# Patient Record
Sex: FEMALE | Race: WHITE | NOT HISPANIC OR LATINO | Employment: UNEMPLOYED | URBAN - METROPOLITAN AREA
[De-identification: names, ages, dates, MRNs, and addresses within clinical notes are randomized per-mention and may not be internally consistent; named-entity substitution may affect disease eponyms.]

---

## 2022-08-30 ENCOUNTER — LAB REQUISITION (OUTPATIENT)
Dept: LAB | Facility: CLINIC | Age: 37
End: 2022-08-30

## 2022-08-30 DIAGNOSIS — N92.1 EXCESSIVE AND FREQUENT MENSTRUATION WITH IRREGULAR CYCLE: ICD-10-CM

## 2022-08-30 LAB — TSH SERPL DL<=0.005 MIU/L-ACNC: 2.63 UIU/ML (ref 0.3–4.2)

## 2022-08-30 PROCEDURE — 84443 ASSAY THYROID STIM HORMONE: CPT | Performed by: OBSTETRICS & GYNECOLOGY

## 2022-09-20 ENCOUNTER — LAB REQUISITION (OUTPATIENT)
Dept: LAB | Facility: CLINIC | Age: 37
End: 2022-09-20

## 2022-09-20 DIAGNOSIS — D25.9 LEIOMYOMA OF UTERUS, UNSPECIFIED: ICD-10-CM

## 2022-09-20 PROCEDURE — 88305 TISSUE EXAM BY PATHOLOGIST: CPT | Performed by: STUDENT IN AN ORGANIZED HEALTH CARE EDUCATION/TRAINING PROGRAM

## 2022-09-22 LAB
PATH REPORT.COMMENTS IMP SPEC: NORMAL
PATH REPORT.COMMENTS IMP SPEC: NORMAL
PATH REPORT.FINAL DX SPEC: NORMAL
PATH REPORT.GROSS SPEC: NORMAL
PATH REPORT.MICROSCOPIC SPEC OTHER STN: NORMAL
PATH REPORT.RELEVANT HX SPEC: NORMAL
PHOTO IMAGE: NORMAL

## 2022-09-28 ENCOUNTER — TRANSFERRED RECORDS (OUTPATIENT)
Dept: HEALTH INFORMATION MANAGEMENT | Facility: CLINIC | Age: 37
End: 2022-09-28

## 2022-09-29 PROBLEM — K51.90 ULCERATIVE COLITIS (H): Status: ACTIVE | Noted: 2022-09-29

## 2022-09-29 PROBLEM — D21.9 FIBROID: Status: ACTIVE | Noted: 2022-09-29

## 2022-09-29 PROBLEM — N92.1 MENOMETRORRHAGIA: Status: ACTIVE | Noted: 2022-09-29

## 2022-09-29 PROBLEM — F41.9 ANXIETY AND DEPRESSION: Status: ACTIVE | Noted: 2022-09-29

## 2022-09-29 PROBLEM — D50.9 IRON DEFICIENCY ANEMIA: Status: ACTIVE | Noted: 2022-09-29

## 2022-09-29 PROBLEM — F32.A ANXIETY AND DEPRESSION: Status: ACTIVE | Noted: 2022-09-29

## 2022-10-13 ENCOUNTER — HOSPITAL ENCOUNTER (EMERGENCY)
Facility: CLINIC | Age: 37
Discharge: HOME | End: 2022-10-13
Attending: EMERGENCY MEDICINE | Admitting: EMERGENCY MEDICINE
Payer: COMMERCIAL

## 2022-10-13 VITALS
HEIGHT: 63 IN | HEART RATE: 89 BPM | TEMPERATURE: 98.3 F | WEIGHT: 180 LBS | OXYGEN SATURATION: 99 % | RESPIRATION RATE: 19 BRPM | BODY MASS INDEX: 31.89 KG/M2 | DIASTOLIC BLOOD PRESSURE: 59 MMHG | SYSTOLIC BLOOD PRESSURE: 118 MMHG

## 2022-10-13 DIAGNOSIS — N92.1 MENORRHAGIA WITH IRREGULAR CYCLE: ICD-10-CM

## 2022-10-13 DIAGNOSIS — D25.9 UTERINE LEIOMYOMA, UNSPECIFIED LOCATION: ICD-10-CM

## 2022-10-13 DIAGNOSIS — D64.9 SYMPTOMATIC ANEMIA: ICD-10-CM

## 2022-10-13 LAB
ABO/RH(D): NORMAL
ANION GAP SERPL CALCULATED.3IONS-SCNC: 4 MMOL/L (ref 3–14)
ANTIBODY SCREEN: NEGATIVE
BASOPHILS # BLD AUTO: 0 10E3/UL (ref 0–0.2)
BASOPHILS NFR BLD AUTO: 1 %
BLD PROD TYP BPU: NORMAL
BLOOD COMPONENT TYPE: NORMAL
BUN SERPL-MCNC: 9 MG/DL (ref 7–30)
CALCIUM SERPL-MCNC: 8.4 MG/DL (ref 8.5–10.1)
CHLORIDE BLD-SCNC: 107 MMOL/L (ref 94–109)
CO2 SERPL-SCNC: 27 MMOL/L (ref 20–32)
CODING SYSTEM: NORMAL
CREAT SERPL-MCNC: 0.56 MG/DL (ref 0.52–1.04)
CROSSMATCH: NORMAL
EOSINOPHIL # BLD AUTO: 0.2 10E3/UL (ref 0–0.7)
EOSINOPHIL NFR BLD AUTO: 4 %
ERYTHROCYTE [DISTWIDTH] IN BLOOD BY AUTOMATED COUNT: 16.9 % (ref 10–15)
GFR SERPL CREATININE-BSD FRML MDRD: >90 ML/MIN/1.73M2
GLUCOSE BLD-MCNC: 121 MG/DL (ref 70–99)
HCT VFR BLD AUTO: 24.5 % (ref 35–47)
HGB BLD-MCNC: 7.2 G/DL (ref 11.7–15.7)
HOLD SPECIMEN: NORMAL
IMM GRANULOCYTES # BLD: 0 10E3/UL
IMM GRANULOCYTES NFR BLD: 0 %
ISSUE DATE AND TIME: NORMAL
LYMPHOCYTES # BLD AUTO: 1.5 10E3/UL (ref 0.8–5.3)
LYMPHOCYTES NFR BLD AUTO: 28 %
MCH RBC QN AUTO: 28.1 PG (ref 26.5–33)
MCHC RBC AUTO-ENTMCNC: 29.4 G/DL (ref 31.5–36.5)
MCV RBC AUTO: 96 FL (ref 78–100)
MONOCYTES # BLD AUTO: 0.3 10E3/UL (ref 0–1.3)
MONOCYTES NFR BLD AUTO: 6 %
NEUTROPHILS # BLD AUTO: 3.3 10E3/UL (ref 1.6–8.3)
NEUTROPHILS NFR BLD AUTO: 61 %
NRBC # BLD AUTO: 0 10E3/UL
NRBC BLD AUTO-RTO: 0 /100
PLATELET # BLD AUTO: 353 10E3/UL (ref 150–450)
POTASSIUM BLD-SCNC: 3.7 MMOL/L (ref 3.4–5.3)
RBC # BLD AUTO: 2.56 10E6/UL (ref 3.8–5.2)
SODIUM SERPL-SCNC: 138 MMOL/L (ref 133–144)
SPECIMEN EXPIRATION DATE: NORMAL
UNIT ABO/RH: NORMAL
UNIT NUMBER: NORMAL
UNIT STATUS: NORMAL
UNIT TYPE ISBT: 5100
WBC # BLD AUTO: 5.4 10E3/UL (ref 4–11)

## 2022-10-13 PROCEDURE — 86901 BLOOD TYPING SEROLOGIC RH(D): CPT | Performed by: EMERGENCY MEDICINE

## 2022-10-13 PROCEDURE — 99285 EMERGENCY DEPT VISIT HI MDM: CPT | Mod: 25

## 2022-10-13 PROCEDURE — 36430 TRANSFUSION BLD/BLD COMPNT: CPT

## 2022-10-13 PROCEDURE — 96375 TX/PRO/DX INJ NEW DRUG ADDON: CPT

## 2022-10-13 PROCEDURE — 250N000011 HC RX IP 250 OP 636: Performed by: EMERGENCY MEDICINE

## 2022-10-13 PROCEDURE — 86850 RBC ANTIBODY SCREEN: CPT | Performed by: EMERGENCY MEDICINE

## 2022-10-13 PROCEDURE — 80048 BASIC METABOLIC PNL TOTAL CA: CPT | Performed by: EMERGENCY MEDICINE

## 2022-10-13 PROCEDURE — 85025 COMPLETE CBC W/AUTO DIFF WBC: CPT | Performed by: EMERGENCY MEDICINE

## 2022-10-13 PROCEDURE — 96374 THER/PROPH/DIAG INJ IV PUSH: CPT

## 2022-10-13 PROCEDURE — P9016 RBC LEUKOCYTES REDUCED: HCPCS | Performed by: EMERGENCY MEDICINE

## 2022-10-13 PROCEDURE — 36415 COLL VENOUS BLD VENIPUNCTURE: CPT | Performed by: EMERGENCY MEDICINE

## 2022-10-13 PROCEDURE — 86923 COMPATIBILITY TEST ELECTRIC: CPT | Performed by: EMERGENCY MEDICINE

## 2022-10-13 PROCEDURE — 250N000009 HC RX 250: Performed by: EMERGENCY MEDICINE

## 2022-10-13 RX ORDER — KETOROLAC TROMETHAMINE 15 MG/ML
30 INJECTION, SOLUTION INTRAMUSCULAR; INTRAVENOUS ONCE
Status: COMPLETED | OUTPATIENT
Start: 2022-10-13 | End: 2022-10-13

## 2022-10-13 RX ADMIN — TRANEXAMIC ACID 1 G: 100 INJECTION, SOLUTION INTRAVENOUS at 12:39

## 2022-10-13 RX ADMIN — KETOROLAC TROMETHAMINE 30 MG: 15 INJECTION, SOLUTION INTRAMUSCULAR; INTRAVENOUS at 13:10

## 2022-10-13 ASSESSMENT — ENCOUNTER SYMPTOMS
WEAKNESS: 1
LIGHT-HEADEDNESS: 1
HEADACHES: 1
FATIGUE: 1

## 2022-10-13 ASSESSMENT — ACTIVITIES OF DAILY LIVING (ADL)
ADLS_ACUITY_SCORE: 35

## 2022-10-13 NOTE — ED TRIAGE NOTES
Pt. At ONC clinic today and had blood work and hgb is low. Pt was sent here for a transfusion.     Triage Assessment     Row Name 10/13/22 0926       Triage Assessment (Adult)    Airway WDL WDL       Respiratory WDL    Respiratory WDL WDL       Skin Circulation/Temperature WDL    Skin Circulation/Temperature WDL X  pale       Cardiac WDL    Cardiac WDL WDL       Peripheral/Neurovascular WDL    Peripheral Neurovascular WDL WDL       Cognitive/Neuro/Behavioral WDL    Cognitive/Neuro/Behavioral WDL WDL

## 2022-10-13 NOTE — ED PROVIDER NOTES
History   Chief Complaint:  Abnormal Labs       The history is provided by the patient.      Audelia Hernandez is a 36 year old female with history of uterine fibroid and iron-deficiency anemia who presents with low hemoglobin and vaginal bleeding. The patient states that she has a fibroid on her uterus that has caused bleeding in the past, and has a plan to have it removed on 10/19. With this, she typically does experience bleeding every 5-6 days with some clotting, but is manageable with a tampon and pad. She had her hemoglobin checked on 10/7 and it was 9.9, the highest it has been in a long time. Then beginning on 10/9, she had extensive, heavy bleeding with clots that was painful and didn't stop until the morning of 10/10. She noted that laying or sitting down helped, and tried to decrease activity the following days. Then on the afternoon of 10/12, she started experiencing more heavy bleeding that contained both clots and running blood, that continued through the night and into this morning. This is accompanied with worsening weakness, headaches, fatigue, and lightheadedness. She went into to [facility] to have her hemoglobin checked, as they are concerned she might also have a rare form of uterine cancer that they will be checking for on 10/19, and there her hemoglobin was noted to be 6.5, so they recommended she come to the ED. During my evaluation, she noted that she was feeling a little better and that the bleeding has lessened and is manageable now, but still has weakness, headaches, and fatigue. She also shared that she did have an iron transfusion one month ago, takes prescription iron supplements, did try hormone therapy in the past with no success, and is currently taking Myfembree, a different hormone therapy.       Review of Systems   Constitutional: Positive for fatigue.   Genitourinary: Positive for pelvic pain and vaginal bleeding (clots, running blood).   Neurological: Positive for  "weakness, light-headedness and headaches.   All other systems reviewed and are negative.        Allergies:  The patient has no known allergies.     Medications:  Zoloft  Wellbutrin  Ritalin  Imitrex  Klonopin  Azulfidine  Pristiq  Ferosul  Mariana Ortiz    Past Medical History:     Insomnia  Migraine  ADHD  OCD  Depression  Agoraphobia with panic disorder  Iron deficiency anemia due to chronic blood loss  Chronic ulcerative proctitis  Hematochezia  MARÍA  Pre-eclampsia in pregnancy  Uterine fibroid  Ulcerative colitis     Past Surgical History:     section  [place] teeth extraction     Family History:    Type II diabetes - father  Anxiety - mother  ADHD - brother    Social History:  Patient came from [facility].  Patient is unaccompanied in the ED.  Patient sees Dr. Mckoy with [facility].    Physical Exam     Patient Vitals for the past 24 hrs:   BP Temp Temp src Pulse Resp SpO2 Height Weight   10/13/22 1527 102/50 98.3  F (36.8  C) Oral 80 16 100 % -- --   10/13/22 1400 113/64 -- -- 88 14 100 % -- --   10/13/22 1350 115/61 -- -- 90 14 100 % -- --   10/13/22 1330 -- -- -- 94 13 99 % -- --   10/13/22 1313 115/61 98.8  F (37.1  C) -- 88 20 -- -- --   10/13/22 1300 111/62 -- -- 84 20 99 % -- --   10/13/22 1201 107/58 -- -- 82 22 97 % -- --   10/13/22 1100 119/61 -- -- 91 11 97 % -- --   10/13/22 1030 109/58 -- -- 93 8 99 % -- --   10/13/22 0958 114/55 -- -- 98 12 98 % -- --   10/13/22 0953 134/79 -- -- 110 17 99 % -- --   10/13/22 0915 131/71 98.2  F (36.8  C) Oral 94 16 100 % 1.6 m (5' 3\") 81.6 kg (180 lb)       Physical Exam  General: Patient is alert and normal appearing.  HEENT: Head atraumatic    Eyes: pupils equal and reactive. Conjunctiva clear   Nares: patent   Oropharynx: no lesions, uvula midline, no palatal draping, normal voice, no trismus  Neck: Supple without lymphadenopathy, no meningismus  Chest: Heart regular rate and rhythm.   Lungs: Mildly dyspneic appearing equal clear to " auscultation with no wheeze or rales  Abdomen: Soft, non tender, nondistended, normal bowel sounds  Back: No costovertebral angle tenderness, no midline C, T or L spine tenderness  Neuro: Grossly nonfocal, normal speech, strength equal bilaterally, CN 2-12 intact  Extremities: No deformities, equal radial and DP pulses. No clubbing, cyanosis.  No edema  Skin: Warm and dry with no rash.       Emergency Department Course     Laboratory:  Labs Ordered and Resulted from Time of ED Arrival to Time of ED Departure   BASIC METABOLIC PANEL - Abnormal       Result Value    Sodium 138      Potassium 3.7      Chloride 107      Carbon Dioxide (CO2) 27      Anion Gap 4      Urea Nitrogen 9      Creatinine 0.56      Calcium 8.4 (*)     Glucose 121 (*)     GFR Estimate >90     CBC WITH PLATELETS AND DIFFERENTIAL - Abnormal    WBC Count 5.4      RBC Count 2.56 (*)     Hemoglobin 7.2 (*)     Hematocrit 24.5 (*)     MCV 96      MCH 28.1      MCHC 29.4 (*)     RDW 16.9 (*)     Platelet Count 353      % Neutrophils 61      % Lymphocytes 28      % Monocytes 6      % Eosinophils 4      % Basophils 1      % Immature Granulocytes 0      NRBCs per 100 WBC 0      Absolute Neutrophils 3.3      Absolute Lymphocytes 1.5      Absolute Monocytes 0.3      Absolute Eosinophils 0.2      Absolute Basophils 0.0      Absolute Immature Granulocytes 0.0      Absolute NRBCs 0.0     TYPE AND SCREEN, ADULT    ABO/RH(D) O POS      Antibody Screen Negative      SPECIMEN EXPIRATION DATE [id number]     PREPARE RED BLOOD CELLS (UNIT)    Blood Component Type Red Blood Cells      Product Code [id number]      Unit Status Transfused      Unit Number [id number]      UNIT ABO/RH O+      CODING SYSTEM [id number]      UNIT TYPE ISBT 5100      CROSSMATCH Compatible      ISSUE DATE AND TIME [id number]     PREPARE RED BLOOD CELLS (UNIT)   TRANSFUSE RED BLOOD CELLS (UNIT)   ABO/RH TYPE AND SCREEN          Emergency Department Course:       Reviewed:  I reviewed  nursing notes, vitals, past medical history and Care Everywhere    Assessments:  0939 I obtained history and examined the patient as noted above.   1128 I rechecked the patient and explained findings.   1547 I rechecked and updated the patient.    Consults:  1105 I spoke with the nurse with Dr. Mckoy who was unwilling to speak with me over the phone, and said that any decisions need to be made with the patient's primary OB/GYN.    1123 I spoke with Dr. Leal who is on call for Dr. Cortes with [facility] about the patient's history and plan of care.    Interventions:  1239 Tranexamic acid 1 g IV  1310 Toradol 30 mg IV  1313 RBC transfusion 1 unit IV    Disposition:  The patient was discharged to home.     Impression & Plan     Medical Decision Making:  Is a 36-year-old female with menorrhagia with scheduled surgery in 6 days for a uterine fibroid with possible cancer-like features.  Patient has been on Myfembree to slow her bleeding.  She had been prescribed Lysteda but did not realize that and had never started that to control the bleeding.  Patient states that she has had increased bleeding over the last 5 days with frequent episodes of hours where she is pouring blood out of her vagina including large clots.  She began feeling more dizzy and lightheaded and fatigued today and went to [facility] clinic where she was found to have a hemoglobin of 6.5 and referred to the emergency department for transfusion.  Work-up in the emergency department undertaken as noted above.  She got 1 g of TXA as well as 1 unit of packed red blood cells here.  I discussed with her OB/GYN who recommended her starting Lysteda.  Patient is agreeable with this.  She will continue to plan for surgery next week.  Patient tolerated transfusion well.  Bleeding precautions and return precautions the emergency department discussed patient agreed with the plan.      Diagnosis:    ICD-10-CM    1. Menorrhagia with irregular cycle  N92.1        2. Symptomatic anemia  D64.9       3. Uterine leiomyoma, unspecified location  D25.9           Discharge Medications:  New Prescriptions    No medications on file       Scribe Disclosure:  I, Oksana Christian, am serving as a scribe at 9:39 AM on 10/13/2022 to document services personally performed by Zuri Hammonds MD based on my observations and the provider's statements to me.        Zuri Hammonds MD  10/13/22 8234

## 2022-10-17 ENCOUNTER — HOSPITAL ENCOUNTER (OUTPATIENT)
Facility: CLINIC | Age: 37
Discharge: HOME | End: 2022-10-17
Admitting: OBSTETRICS & GYNECOLOGY
Payer: COMMERCIAL

## 2022-10-17 VITALS
DIASTOLIC BLOOD PRESSURE: 66 MMHG | TEMPERATURE: 95.8 F | RESPIRATION RATE: 16 BRPM | HEART RATE: 100 BPM | SYSTOLIC BLOOD PRESSURE: 131 MMHG | OXYGEN SATURATION: 98 %

## 2022-10-17 LAB
ABO/RH(D): NORMAL
ANTIBODY SCREEN: NEGATIVE
BLD PROD TYP BPU: NORMAL
BLOOD COMPONENT TYPE: NORMAL
CODING SYSTEM: NORMAL
CROSSMATCH: NORMAL
ISSUE DATE AND TIME: NORMAL
SPECIMEN EXPIRATION DATE: NORMAL
UNIT ABO/RH: NORMAL
UNIT NUMBER: NORMAL
UNIT STATUS: NORMAL
UNIT TYPE ISBT: 5100

## 2022-10-17 PROCEDURE — 36430 TRANSFUSION BLD/BLD COMPNT: CPT

## 2022-10-17 PROCEDURE — 86923 COMPATIBILITY TEST ELECTRIC: CPT | Performed by: OBSTETRICS & GYNECOLOGY

## 2022-10-17 PROCEDURE — 86850 RBC ANTIBODY SCREEN: CPT | Performed by: OBSTETRICS & GYNECOLOGY

## 2022-10-17 PROCEDURE — 86901 BLOOD TYPING SEROLOGIC RH(D): CPT | Performed by: OBSTETRICS & GYNECOLOGY

## 2022-10-17 PROCEDURE — P9016 RBC LEUKOCYTES REDUCED: HCPCS | Performed by: OBSTETRICS & GYNECOLOGY

## 2022-10-17 PROCEDURE — 36415 COLL VENOUS BLD VENIPUNCTURE: CPT | Performed by: OBSTETRICS & GYNECOLOGY

## 2022-10-17 RX ORDER — METHYLPHENIDATE HYDROCHLORIDE 20 MG/1
30 TABLET ORAL 3 TIMES DAILY PRN
COMMUNITY

## 2022-10-17 RX ORDER — ZOLPIDEM TARTRATE 10 MG/1
10 TABLET ORAL
COMMUNITY

## 2022-10-17 RX ORDER — OXYCODONE AND ACETAMINOPHEN 5; 325 MG/1; MG/1
1 TABLET ORAL EVERY 6 HOURS PRN
Status: ON HOLD | COMMUNITY
End: 2022-11-15

## 2022-10-17 RX ORDER — TRAZODONE HYDROCHLORIDE 100 MG/1
50 TABLET ORAL
COMMUNITY

## 2022-10-17 RX ORDER — FOLIC ACID 1 MG/1
1 TABLET ORAL EVERY EVENING
COMMUNITY

## 2022-10-17 RX ORDER — DESVENLAFAXINE 50 MG/1
50 TABLET, FILM COATED, EXTENDED RELEASE ORAL EVERY EVENING
COMMUNITY

## 2022-10-17 RX ORDER — CLONAZEPAM 1 MG/1
1 TABLET ORAL 2 TIMES DAILY PRN
COMMUNITY
End: 2022-10-19

## 2022-10-17 RX ORDER — MESALAMINE 1000 MG/1
1000 SUPPOSITORY RECTAL AT BEDTIME
Status: ON HOLD | COMMUNITY
End: 2022-10-17

## 2022-10-17 RX ORDER — FERROUS SULFATE 325(65) MG
325 TABLET ORAL 2 TIMES DAILY
COMMUNITY

## 2022-10-17 RX ORDER — SULFASALAZINE 500 MG/1
500 TABLET ORAL EVERY EVENING
COMMUNITY

## 2022-10-17 RX ORDER — TRANEXAMIC ACID 650 MG/1
1300 TABLET ORAL 3 TIMES DAILY
COMMUNITY
End: 2022-11-14

## 2022-10-17 RX ORDER — SUMATRIPTAN 50 MG/1
50 TABLET, FILM COATED ORAL
Status: ON HOLD | COMMUNITY
End: 2022-10-17

## 2022-10-17 RX ORDER — SERTRALINE HYDROCHLORIDE 100 MG/1
200 TABLET, FILM COATED ORAL EVERY EVENING
COMMUNITY

## 2022-10-17 ASSESSMENT — ACTIVITIES OF DAILY LIVING (ADL)
ADLS_ACUITY_SCORE: 35

## 2022-10-17 NOTE — PROGRESS NOTES
1455 Report received from Kerri Garcia RN.  1515 Pt ambulating in halls with good edmar. Transfusion continues w/o difficulty. No reaction symptoms noted.  1550 Pt resting quietly. Watching TV. No complaints. Family at bedside.  1615 Transfusion complete. No transfusion reaction.   1645 Discharge instructions given to pt. All questions & concerns addressed.  1647 Pt discharged per ambulatory to private vehicle. Accompanied by family. All personal belongings taken with pt.

## 2022-10-17 NOTE — PROGRESS NOTES
"Care Suites Admission Nursing Note    Patient Information  Name: Audelia Hernandez  Age: 36 year old  Reason for admission: Hgb. 7.5   Transfuse 1 PRBC   Care Suites arrival time: 1230    Visitor Information  Name: Kelley Sue   Informed of visitor restrictions: Yes  1 visitor allowed per patient   Visitor must screen negative for COVID symptoms   Visitor must wear a mask  Waiting rooms closed to visitors    Patient Admission/Assessment   Pre-procedure assessment complete: Yes  Surgery (uterine fibroid) on Weds., 10/19  10/13- Hgb. 6.5 transfused with 1 UPRBC  10/17- Hgb. 7.5   If abnormal assessment/labs, provider notified: N/A  NPO: N/A  Medications held per instructions/orders: Yes  Consent: obtained  If applicable, pregnancy test status: deferred  Patient oriented to room: Yes  Education/questions answered: Yes  Plan/other: Transfuse 1 unit PRBC  1400- VSS, Pt. Does c/o \"migraine\" headache that has come on suddenly. Pt. Given ice packs and caffeine   1415- 1 unit PRBC started     1500- Report given to Mell APONTE    Discharge Planning  Discharge name/phone number: Kelley sue    [phone]  Overnight post sedation caregiver: self  Discharge location: home    Kerri Garcia RN         "

## 2022-10-18 NOTE — OR NURSING
Dr. Mckoy's nurse called re:pt will have hemoglobin drawn tomorrow morning at 9am at Dr. Mckoy's office, may need blood transfusion tomorrow morning.  Office will send patient to [place] after blood draw if she needs blood transfusion before surgery.  If she does not need blood transfusion, pt will arrive at 11am.  Hemoglobin 6.5 on 10/14/22, received 1 unit blood.  Monday 10/17 Hemoglobin 7.5, pt received 1 unit blood.  [facility] PACU charge nurse notified.  Type and Screen done 10/17, per blood bank pt does not need a new type and screen if transfusion is needed 10/19.

## 2022-10-19 ENCOUNTER — ANESTHESIA EVENT (OUTPATIENT)
Dept: SURGERY | Facility: CLINIC | Age: 37
End: 2022-10-19
Payer: COMMERCIAL

## 2022-10-19 ENCOUNTER — ANESTHESIA (OUTPATIENT)
Dept: SURGERY | Facility: CLINIC | Age: 37
End: 2022-10-19
Payer: COMMERCIAL

## 2022-10-19 ENCOUNTER — HOSPITAL ENCOUNTER (OUTPATIENT)
Facility: CLINIC | Age: 37
Discharge: HOME | End: 2022-10-19
Attending: OBSTETRICS & GYNECOLOGY | Admitting: OBSTETRICS & GYNECOLOGY
Payer: COMMERCIAL

## 2022-10-19 ENCOUNTER — HOSPITAL ENCOUNTER (EMERGENCY)
Facility: CLINIC | Age: 37
Discharge: HOME | End: 2022-10-20
Attending: EMERGENCY MEDICINE | Admitting: EMERGENCY MEDICINE
Payer: COMMERCIAL

## 2022-10-19 VITALS
BODY MASS INDEX: 34.04 KG/M2 | DIASTOLIC BLOOD PRESSURE: 65 MMHG | SYSTOLIC BLOOD PRESSURE: 122 MMHG | WEIGHT: 185 LBS | HEIGHT: 62 IN | TEMPERATURE: 99 F | OXYGEN SATURATION: 99 % | HEART RATE: 84 BPM | RESPIRATION RATE: 16 BRPM

## 2022-10-19 DIAGNOSIS — G89.18 ACUTE POST-OPERATIVE PAIN: ICD-10-CM

## 2022-10-19 DIAGNOSIS — R30.0 DYSURIA: ICD-10-CM

## 2022-10-19 DIAGNOSIS — D21.9 FIBROID: Primary | ICD-10-CM

## 2022-10-19 LAB
ALBUMIN UR-MCNC: NEGATIVE MG/DL
APPEARANCE UR: CLEAR
B-HCG SERPL-ACNC: <1 IU/L (ref 0–5)
BILIRUB UR QL STRIP: NEGATIVE
COLOR UR AUTO: ABNORMAL
GLUCOSE UR STRIP-MCNC: NEGATIVE MG/DL
HGB UR QL STRIP: ABNORMAL
IRON SATN MFR SERPL: 8 % (ref 15–46)
IRON SERPL-MCNC: 25 UG/DL (ref 35–180)
KETONES UR STRIP-MCNC: NEGATIVE MG/DL
LEUKOCYTE ESTERASE UR QL STRIP: NEGATIVE
NITRATE UR QL: POSITIVE
PH UR STRIP: 7 [PH] (ref 5–7)
RBC URINE: 0 /HPF
SP GR UR STRIP: 1.01 (ref 1–1.03)
SQUAMOUS EPITHELIAL: <1 /HPF
TIBC SERPL-MCNC: 329 UG/DL (ref 240–430)
UROBILINOGEN UR STRIP-MCNC: NORMAL MG/DL
WBC URINE: 1 /HPF

## 2022-10-19 PROCEDURE — 370N000017 HC ANESTHESIA TECHNICAL FEE, PER MIN: Performed by: OBSTETRICS & GYNECOLOGY

## 2022-10-19 PROCEDURE — 81001 URINALYSIS AUTO W/SCOPE: CPT | Performed by: EMERGENCY MEDICINE

## 2022-10-19 PROCEDURE — 250N000009 HC RX 250: Performed by: OBSTETRICS & GYNECOLOGY

## 2022-10-19 PROCEDURE — 250N000009 HC RX 250

## 2022-10-19 PROCEDURE — 99284 EMERGENCY DEPT VISIT MOD MDM: CPT

## 2022-10-19 PROCEDURE — 250N000011 HC RX IP 250 OP 636: Performed by: OBSTETRICS & GYNECOLOGY

## 2022-10-19 PROCEDURE — 360N000080 HC SURGERY LEVEL 7, PER MIN: Performed by: OBSTETRICS & GYNECOLOGY

## 2022-10-19 PROCEDURE — 710N000009 HC RECOVERY PHASE 1, LEVEL 1, PER MIN: Performed by: OBSTETRICS & GYNECOLOGY

## 2022-10-19 PROCEDURE — 710N000012 HC RECOVERY PHASE 2, PER MINUTE: Performed by: OBSTETRICS & GYNECOLOGY

## 2022-10-19 PROCEDURE — 88305 TISSUE EXAM BY PATHOLOGIST: CPT | Mod: TC | Performed by: OBSTETRICS & GYNECOLOGY

## 2022-10-19 PROCEDURE — 87086 URINE CULTURE/COLONY COUNT: CPT | Performed by: EMERGENCY MEDICINE

## 2022-10-19 PROCEDURE — 250N000013 HC RX MED GY IP 250 OP 250 PS 637: Performed by: EMERGENCY MEDICINE

## 2022-10-19 PROCEDURE — 272N000001 HC OR GENERAL SUPPLY STERILE: Performed by: OBSTETRICS & GYNECOLOGY

## 2022-10-19 PROCEDURE — 36415 COLL VENOUS BLD VENIPUNCTURE: CPT | Performed by: OBSTETRICS & GYNECOLOGY

## 2022-10-19 PROCEDURE — 250N000013 HC RX MED GY IP 250 OP 250 PS 637: Performed by: ANESTHESIOLOGY

## 2022-10-19 PROCEDURE — 83550 IRON BINDING TEST: CPT | Performed by: PHYSICIAN ASSISTANT

## 2022-10-19 PROCEDURE — 250N000011 HC RX IP 250 OP 636

## 2022-10-19 PROCEDURE — 999N000141 HC STATISTIC PRE-PROCEDURE NURSING ASSESSMENT: Performed by: OBSTETRICS & GYNECOLOGY

## 2022-10-19 PROCEDURE — 258N000003 HC RX IP 258 OP 636

## 2022-10-19 PROCEDURE — 88305 TISSUE EXAM BY PATHOLOGIST: CPT | Mod: 26 | Performed by: PATHOLOGY

## 2022-10-19 PROCEDURE — 84702 CHORIONIC GONADOTROPIN TEST: CPT | Performed by: OBSTETRICS & GYNECOLOGY

## 2022-10-19 PROCEDURE — 250N000025 HC SEVOFLURANE, PER MIN: Performed by: OBSTETRICS & GYNECOLOGY

## 2022-10-19 PROCEDURE — 250N000011 HC RX IP 250 OP 636: Performed by: ANESTHESIOLOGY

## 2022-10-19 PROCEDURE — 88331 PATH CONSLTJ SURG 1 BLK 1SPC: CPT | Mod: 26 | Performed by: PATHOLOGY

## 2022-10-19 PROCEDURE — 88331 PATH CONSLTJ SURG 1 BLK 1SPC: CPT | Mod: TC | Performed by: OBSTETRICS & GYNECOLOGY

## 2022-10-19 PROCEDURE — 258N000001 HC RX 258: Performed by: OBSTETRICS & GYNECOLOGY

## 2022-10-19 RX ORDER — LIDOCAINE HYDROCHLORIDE 20 MG/ML
INJECTION, SOLUTION INFILTRATION; PERINEURAL PRN
Status: DISCONTINUED | OUTPATIENT
Start: 2022-10-19 | End: 2022-10-19

## 2022-10-19 RX ORDER — ONDANSETRON 4 MG/1
4 TABLET, ORALLY DISINTEGRATING ORAL EVERY 30 MIN PRN
Status: DISCONTINUED | OUTPATIENT
Start: 2022-10-19 | End: 2022-10-19 | Stop reason: HOSPADM

## 2022-10-19 RX ORDER — SODIUM CHLORIDE, SODIUM LACTATE, POTASSIUM CHLORIDE, CALCIUM CHLORIDE 600; 310; 30; 20 MG/100ML; MG/100ML; MG/100ML; MG/100ML
INJECTION, SOLUTION INTRAVENOUS CONTINUOUS
Status: DISCONTINUED | OUTPATIENT
Start: 2022-10-19 | End: 2022-10-19 | Stop reason: HOSPADM

## 2022-10-19 RX ORDER — ONDANSETRON 2 MG/ML
INJECTION INTRAMUSCULAR; INTRAVENOUS PRN
Status: DISCONTINUED | OUTPATIENT
Start: 2022-10-19 | End: 2022-10-19

## 2022-10-19 RX ORDER — DEXAMETHASONE SODIUM PHOSPHATE 4 MG/ML
INJECTION, SOLUTION INTRA-ARTICULAR; INTRALESIONAL; INTRAMUSCULAR; INTRAVENOUS; SOFT TISSUE PRN
Status: DISCONTINUED | OUTPATIENT
Start: 2022-10-19 | End: 2022-10-19

## 2022-10-19 RX ORDER — ACETAMINOPHEN 500 MG
1000 TABLET ORAL EVERY 6 HOURS PRN
Status: ON HOLD | COMMUNITY
End: 2022-11-15

## 2022-10-19 RX ORDER — SODIUM CHLORIDE, SODIUM LACTATE, POTASSIUM CHLORIDE, CALCIUM CHLORIDE 600; 310; 30; 20 MG/100ML; MG/100ML; MG/100ML; MG/100ML
INJECTION, SOLUTION INTRAVENOUS CONTINUOUS PRN
Status: DISCONTINUED | OUTPATIENT
Start: 2022-10-19 | End: 2022-10-19

## 2022-10-19 RX ORDER — HYDROMORPHONE HCL IN WATER/PF 6 MG/30 ML
0.2 PATIENT CONTROLLED ANALGESIA SYRINGE INTRAVENOUS EVERY 5 MIN PRN
Status: DISCONTINUED | OUTPATIENT
Start: 2022-10-19 | End: 2022-10-19 | Stop reason: HOSPADM

## 2022-10-19 RX ORDER — GLYCOPYRROLATE 0.2 MG/ML
INJECTION, SOLUTION INTRAMUSCULAR; INTRAVENOUS PRN
Status: DISCONTINUED | OUTPATIENT
Start: 2022-10-19 | End: 2022-10-19

## 2022-10-19 RX ORDER — HYDROXYZINE HYDROCHLORIDE 50 MG/ML
25 INJECTION, SOLUTION INTRAMUSCULAR ONCE
Status: COMPLETED | OUTPATIENT
Start: 2022-10-19 | End: 2022-10-19

## 2022-10-19 RX ORDER — FENTANYL CITRATE 0.05 MG/ML
INJECTION, SOLUTION INTRAMUSCULAR; INTRAVENOUS PRN
Status: DISCONTINUED | OUTPATIENT
Start: 2022-10-19 | End: 2022-10-19

## 2022-10-19 RX ORDER — SENNA AND DOCUSATE SODIUM 50; 8.6 MG/1; MG/1
1-2 TABLET, FILM COATED ORAL 2 TIMES DAILY
Qty: 20 TABLET | Refills: 0 | Status: SHIPPED | OUTPATIENT
Start: 2022-10-19 | End: 2022-11-14

## 2022-10-19 RX ORDER — OXYCODONE HYDROCHLORIDE 5 MG/1
5 TABLET ORAL ONCE
Status: COMPLETED | OUTPATIENT
Start: 2022-10-19 | End: 2022-10-19

## 2022-10-19 RX ORDER — EPHEDRINE SULFATE 50 MG/ML
INJECTION, SOLUTION INTRAMUSCULAR; INTRAVENOUS; SUBCUTANEOUS PRN
Status: DISCONTINUED | OUTPATIENT
Start: 2022-10-19 | End: 2022-10-19

## 2022-10-19 RX ORDER — CEFAZOLIN SODIUM/WATER 2 G/20 ML
2 SYRINGE (ML) INTRAVENOUS SEE ADMIN INSTRUCTIONS
Status: DISCONTINUED | OUTPATIENT
Start: 2022-10-19 | End: 2022-10-19 | Stop reason: HOSPADM

## 2022-10-19 RX ORDER — OXYCODONE HYDROCHLORIDE 5 MG/1
5 TABLET ORAL
Status: CANCELLED | OUTPATIENT
Start: 2022-10-19

## 2022-10-19 RX ORDER — ONDANSETRON 2 MG/ML
4 INJECTION INTRAMUSCULAR; INTRAVENOUS EVERY 30 MIN PRN
Status: DISCONTINUED | OUTPATIENT
Start: 2022-10-19 | End: 2022-10-19 | Stop reason: HOSPADM

## 2022-10-19 RX ORDER — DEXMEDETOMIDINE HYDROCHLORIDE 4 UG/ML
INJECTION, SOLUTION INTRAVENOUS PRN
Status: DISCONTINUED | OUTPATIENT
Start: 2022-10-19 | End: 2022-10-19

## 2022-10-19 RX ORDER — CELECOXIB 100 MG/1
100 CAPSULE ORAL 2 TIMES DAILY
Qty: 30 CAPSULE | Refills: 1 | Status: SHIPPED | OUTPATIENT
Start: 2022-10-19 | End: 2022-11-14

## 2022-10-19 RX ORDER — FENTANYL CITRATE 0.05 MG/ML
25 INJECTION, SOLUTION INTRAMUSCULAR; INTRAVENOUS EVERY 5 MIN PRN
Status: DISCONTINUED | OUTPATIENT
Start: 2022-10-19 | End: 2022-10-19 | Stop reason: HOSPADM

## 2022-10-19 RX ORDER — VASOPRESSIN 20 U/ML
INJECTION PARENTERAL PRN
Status: DISCONTINUED | OUTPATIENT
Start: 2022-10-19 | End: 2022-10-19 | Stop reason: HOSPADM

## 2022-10-19 RX ORDER — BUPIVACAINE HYDROCHLORIDE AND EPINEPHRINE 5; 5 MG/ML; UG/ML
INJECTION, SOLUTION PERINEURAL PRN
Status: DISCONTINUED | OUTPATIENT
Start: 2022-10-19 | End: 2022-10-19 | Stop reason: HOSPADM

## 2022-10-19 RX ORDER — ACETAMINOPHEN 325 MG/1
975 TABLET ORAL ONCE
Status: CANCELLED | OUTPATIENT
Start: 2022-10-19 | End: 2022-10-19

## 2022-10-19 RX ORDER — HYDROMORPHONE HYDROCHLORIDE 1 MG/ML
INJECTION, SOLUTION INTRAMUSCULAR; INTRAVENOUS; SUBCUTANEOUS PRN
Status: DISCONTINUED | OUTPATIENT
Start: 2022-10-19 | End: 2022-10-19

## 2022-10-19 RX ORDER — PHENAZOPYRIDINE HYDROCHLORIDE 100 MG/1
100 TABLET, FILM COATED ORAL ONCE
Status: COMPLETED | OUTPATIENT
Start: 2022-10-19 | End: 2022-10-19

## 2022-10-19 RX ORDER — ONDANSETRON 4 MG/1
4 TABLET, ORALLY DISINTEGRATING ORAL EVERY 8 HOURS PRN
COMMUNITY

## 2022-10-19 RX ORDER — CEFAZOLIN SODIUM/WATER 2 G/20 ML
2 SYRINGE (ML) INTRAVENOUS
Status: COMPLETED | OUTPATIENT
Start: 2022-10-19 | End: 2022-10-19

## 2022-10-19 RX ORDER — OXYCODONE HYDROCHLORIDE 5 MG/1
5 TABLET ORAL EVERY 6 HOURS PRN
Qty: 12 TABLET | Refills: 0 | Status: SHIPPED | OUTPATIENT
Start: 2022-10-19 | End: 2022-11-14

## 2022-10-19 RX ORDER — ACETAMINOPHEN 325 MG/1
975 TABLET ORAL ONCE
Status: DISCONTINUED | OUTPATIENT
Start: 2022-10-19 | End: 2022-10-19 | Stop reason: HOSPADM

## 2022-10-19 RX ORDER — PROPOFOL 10 MG/ML
INJECTION, EMULSION INTRAVENOUS PRN
Status: DISCONTINUED | OUTPATIENT
Start: 2022-10-19 | End: 2022-10-19

## 2022-10-19 RX ADMIN — PHENYLEPHRINE HYDROCHLORIDE 100 MCG: 10 INJECTION INTRAVENOUS at 16:27

## 2022-10-19 RX ADMIN — ROCURONIUM BROMIDE 20 MG: 50 INJECTION, SOLUTION INTRAVENOUS at 15:58

## 2022-10-19 RX ADMIN — DEXAMETHASONE SODIUM PHOSPHATE 4 MG: 4 INJECTION, SOLUTION INTRA-ARTICULAR; INTRALESIONAL; INTRAMUSCULAR; INTRAVENOUS; SOFT TISSUE at 14:15

## 2022-10-19 RX ADMIN — PHENYLEPHRINE HYDROCHLORIDE 100 MCG: 10 INJECTION INTRAVENOUS at 16:33

## 2022-10-19 RX ADMIN — PROPOFOL 30 MG: 10 INJECTION, EMULSION INTRAVENOUS at 16:19

## 2022-10-19 RX ADMIN — PHENYLEPHRINE HYDROCHLORIDE 100 MCG: 10 INJECTION INTRAVENOUS at 13:59

## 2022-10-19 RX ADMIN — PHENYLEPHRINE HYDROCHLORIDE 0.4 MCG/KG/MIN: 10 INJECTION INTRAVENOUS at 14:00

## 2022-10-19 RX ADMIN — Medication 5 MG: at 16:39

## 2022-10-19 RX ADMIN — ROCURONIUM BROMIDE 20 MG: 50 INJECTION, SOLUTION INTRAVENOUS at 14:32

## 2022-10-19 RX ADMIN — ONDANSETRON 4 MG: 2 INJECTION INTRAMUSCULAR; INTRAVENOUS at 16:12

## 2022-10-19 RX ADMIN — HYDROXYZINE HYDROCHLORIDE 25 MG: 50 INJECTION, SOLUTION INTRAMUSCULAR at 17:56

## 2022-10-19 RX ADMIN — PHENYLEPHRINE HYDROCHLORIDE 100 MCG: 10 INJECTION INTRAVENOUS at 16:30

## 2022-10-19 RX ADMIN — HYDROMORPHONE HYDROCHLORIDE 0.5 MG: 1 INJECTION, SOLUTION INTRAMUSCULAR; INTRAVENOUS; SUBCUTANEOUS at 16:49

## 2022-10-19 RX ADMIN — HYDROMORPHONE HYDROCHLORIDE 0.2 MG: 0.2 INJECTION, SOLUTION INTRAMUSCULAR; INTRAVENOUS; SUBCUTANEOUS at 17:34

## 2022-10-19 RX ADMIN — GLYCOPYRROLATE 0.2 MG: 0.2 INJECTION, SOLUTION INTRAMUSCULAR; INTRAVENOUS at 16:40

## 2022-10-19 RX ADMIN — ROCURONIUM BROMIDE 10 MG: 50 INJECTION, SOLUTION INTRAVENOUS at 15:25

## 2022-10-19 RX ADMIN — ONDANSETRON 4 MG: 2 INJECTION INTRAMUSCULAR; INTRAVENOUS at 17:27

## 2022-10-19 RX ADMIN — SODIUM CHLORIDE, SODIUM LACTATE, POTASSIUM CHLORIDE, CALCIUM CHLORIDE: 600; 310; 30; 20 INJECTION, SOLUTION INTRAVENOUS at 14:00

## 2022-10-19 RX ADMIN — MIDAZOLAM 2 MG: 1 INJECTION INTRAMUSCULAR; INTRAVENOUS at 13:50

## 2022-10-19 RX ADMIN — HYDROMORPHONE HYDROCHLORIDE 0.2 MG: 0.2 INJECTION, SOLUTION INTRAMUSCULAR; INTRAVENOUS; SUBCUTANEOUS at 17:26

## 2022-10-19 RX ADMIN — ROCURONIUM BROMIDE 10 MG: 50 INJECTION, SOLUTION INTRAVENOUS at 14:38

## 2022-10-19 RX ADMIN — ROCURONIUM BROMIDE 10 MG: 50 INJECTION, SOLUTION INTRAVENOUS at 15:46

## 2022-10-19 RX ADMIN — SUGAMMADEX 200 MG: 100 INJECTION, SOLUTION INTRAVENOUS at 16:37

## 2022-10-19 RX ADMIN — ROCURONIUM BROMIDE 50 MG: 50 INJECTION, SOLUTION INTRAVENOUS at 13:55

## 2022-10-19 RX ADMIN — DEXMEDETOMIDINE HYDROCHLORIDE 12 MCG: 200 INJECTION INTRAVENOUS at 15:21

## 2022-10-19 RX ADMIN — HYDROMORPHONE HYDROCHLORIDE 0.2 MG: 0.2 INJECTION, SOLUTION INTRAMUSCULAR; INTRAVENOUS; SUBCUTANEOUS at 17:43

## 2022-10-19 RX ADMIN — HYDROMORPHONE HYDROCHLORIDE 0.2 MG: 0.2 INJECTION, SOLUTION INTRAMUSCULAR; INTRAVENOUS; SUBCUTANEOUS at 17:18

## 2022-10-19 RX ADMIN — GLYCOPYRROLATE 0.2 MG: 0.2 INJECTION, SOLUTION INTRAMUSCULAR; INTRAVENOUS at 14:10

## 2022-10-19 RX ADMIN — FENTANYL CITRATE 100 MCG: 50 INJECTION INTRAVENOUS at 13:54

## 2022-10-19 RX ADMIN — OXYCODONE HYDROCHLORIDE 5 MG: 5 TABLET ORAL at 19:03

## 2022-10-19 RX ADMIN — PHENYLEPHRINE HYDROCHLORIDE 100 MCG: 10 INJECTION INTRAVENOUS at 14:03

## 2022-10-19 RX ADMIN — DEXMEDETOMIDINE HYDROCHLORIDE 8 MCG: 200 INJECTION INTRAVENOUS at 15:02

## 2022-10-19 RX ADMIN — PROPOFOL 200 MG: 10 INJECTION, EMULSION INTRAVENOUS at 13:54

## 2022-10-19 RX ADMIN — PHENAZOPYRIDINE HYDROCHLORIDE 100 MG: 100 TABLET ORAL at 23:13

## 2022-10-19 RX ADMIN — ROCURONIUM BROMIDE 10 MG: 50 INJECTION, SOLUTION INTRAVENOUS at 15:32

## 2022-10-19 RX ADMIN — FENTANYL CITRATE 25 MCG: 50 INJECTION, SOLUTION INTRAMUSCULAR; INTRAVENOUS at 17:06

## 2022-10-19 RX ADMIN — SODIUM CHLORIDE, POTASSIUM CHLORIDE, SODIUM LACTATE AND CALCIUM CHLORIDE: 600; 310; 30; 20 INJECTION, SOLUTION INTRAVENOUS at 13:50

## 2022-10-19 RX ADMIN — Medication 2 G: at 14:00

## 2022-10-19 RX ADMIN — HYDROMORPHONE HYDROCHLORIDE 0.5 MG: 1 INJECTION, SOLUTION INTRAMUSCULAR; INTRAVENOUS; SUBCUTANEOUS at 14:44

## 2022-10-19 RX ADMIN — LIDOCAINE HYDROCHLORIDE 100 MG: 20 INJECTION, SOLUTION INFILTRATION; PERINEURAL at 13:54

## 2022-10-19 RX ADMIN — FENTANYL CITRATE 25 MCG: 50 INJECTION, SOLUTION INTRAMUSCULAR; INTRAVENOUS at 17:12

## 2022-10-19 ASSESSMENT — ACTIVITIES OF DAILY LIVING (ADL)
ADLS_ACUITY_SCORE: 35
ADLS_ACUITY_SCORE: 33

## 2022-10-19 ASSESSMENT — LIFESTYLE VARIABLES: TOBACCO_USE: 1

## 2022-10-19 ASSESSMENT — ENCOUNTER SYMPTOMS
ABDOMINAL PAIN: 1
FREQUENCY: 1
DYSURIA: 1

## 2022-10-19 NOTE — DISCHARGE INSTRUCTIONS
Same Day Surgery Discharge Instructions for  Sedation and General Anesthesia     It's not unusual to feel dizzy, light-headed or faint for up to 24 hours after surgery or while taking pain medication.  If you have these symptoms: sit for a few minutes before standing and have someone assist you when you get up to walk or use the bathroom.    You should rest and relax for the next 24 hours. We recommend you make arrangements to have an adult stay with you for at least 24 hours after your discharge.  Avoid hazardous and strenuous activity.    DO NOT DRIVE any vehicle or operate mechanical equipment for 24 hours following the end of your surgery.  Even though you may feel normal, your reactions may be affected by the medication you have received.    Do not drink alcoholic beverages for 24 hours following surgery.     Slowly progress to your regular diet as you feel able. It's not unusual to feel nauseated and/or vomit after receiving anesthesia.  If you develop these symptoms, drink clear liquids (apple juice, ginger ale, broth, 7-up, etc. ) until you feel better.  If your nausea and vomiting persists for 24 hours, please notify your surgeon.      All narcotic pain medications, along with inactivity and anesthesia, can cause constipation. Drinking plenty of liquids and increasing fiber intake will help.    For any questions of a medical nature, call your surgeon.    Do not make important decisions for 24 hours.    If you had general anesthesia, you may have a sore throat for a couple of days related to the breathing tube used during surgery.  You may use Cepacol lozenges to help with this discomfort.  If it worsens or if you develop a fever, contact your surgeon.     If you feel your pain is not well managed with the pain medications prescribed by your surgeon, please contact your surgeon's office to let them know so they can address your concerns.      **If you have questions or concerns about your procedure,   call  Dr. Mckoy [phone]**

## 2022-10-19 NOTE — ANESTHESIA PREPROCEDURE EVALUATION
Prior to Admission medications    Medication Sig Start Date End Date Taking? Authorizing Provider   acetaminophen (TYLENOL) 500 MG tablet Take 1,500 mg by mouth once (3 x 500 mg = 1,500 mg)   Yes Reported, Patient   desvenlafaxine (PRISTIQ) 50 MG 24 hr tablet Take 50 mg by mouth every evening   Yes Reported, Patient   ferrous sulfate (FEROSUL) 325 (65 Fe) MG tablet Take 650 mg by mouth every evening   Yes Reported, Patient   folic acid (FOLVITE) 1 MG tablet Take 1 mg by mouth every evening   Yes Reported, Patient   methylphenidate (RITALIN) 20 MG tablet Take 30 mg by mouth 3 times daily (with meals) (1.5 x 20 mg = 30 mg)   Yes Reported, Patient   Multiple Vitamin (MULTIVITAMIN PO) Take 1 capsule by mouth daily   Yes Reported, Patient   ondansetron (ZOFRAN ODT) 4 MG ODT tab Take 4 mg by mouth every 8 hours as needed for nausea   Yes Reported, Patient   oxyCODONE-acetaminophen (PERCOCET) 5-325 MG tablet Take 1 tablet by mouth every 6 hours as needed for severe pain   Yes Reported, Patient   sertraline (ZOLOFT) 100 MG tablet Take 200 mg by mouth every evening (2 x 100 mg = 200 mg)   Yes Reported, Patient   sulfaSALAzine (AZULFIDINE) 500 MG tablet Take 1,000 mg by mouth every evening (2 x 500 mg = 1,000 mg)   Yes Reported, Patient   tranexamic acid (LYSTEDA) 650 MG tablet Take 1,300 mg by mouth 3 times daily (2 x 650 mg = 1,300 mg)   Yes Reported, Patient   traZODone (DESYREL) 100 MG tablet Take 50 mg by mouth At Bedtime (0.5 x 100 mg = 50 mg)   Yes Reported, Patient   zolpidem (AMBIEN) 10 MG tablet Take 10 mg by mouth nightly as needed for sleep   Yes Reported, Patient     Current Facility-Administered Medications Ordered in Epic   Medication Dose Route Frequency Last Rate Last Admin     acetaminophen (TYLENOL) tablet 975 mg  975 mg Oral Once         ceFAZolin Sodium (ANCEF) injection 2 g  2 g Intravenous Pre-Op/Pre-procedure x 1 dose         ceFAZolin Sodium (ANCEF) injection 2 g  2 g Intravenous See Admin  Instructions         No current [facility]-ordered outpatient medications on file.       No results for input(s): ABO, RH in the last 82971 hours.  No results for input(s): HCG in the last 37924 hours.  No results found for this or any previous visit (from the past 744 hour(s)).Anesthesia Pre-Procedure Evaluation    Patient: Audelia Hernandez   MRN: [id number] : 1985        Procedure : Procedure(s):  ROBOTIC ASSISTED MYOMECTOMY, POSSIBLE LAPAROTOMY          Past Medical History:   Diagnosis Date     ADHD (attention deficit hyperactivity disorder)      Alleged drug diversion      Chronic insomnia      Chronic ulcerative proctitis with rectal bleeding (H)      Daily headache      MARÍA (generalized anxiety disorder)      Hematochezia      Increased frequency of urination      MDD (major depressive disorder)      Migraine      OCD (obsessive compulsive disorder)      Panic disorder without agoraphobia      Tension headache       Past Surgical History:   Procedure Laterality Date     GYN SURGERY           HEAD & NECK SURGERY      [place] teeth extraction      No Known Allergies   Social History     Tobacco Use     Smoking status: Former     Packs/day: 0.50     Types: Cigarettes     Smokeless tobacco: Never   Substance Use Topics     Alcohol use: Not Currently      Wt Readings from Last 1 Encounters:   10/19/22 83.9 kg (185 lb)        Anesthesia Evaluation   Pt has had prior anesthetic.     No history of anesthetic complications       ROS/MED HX  ENT/Pulmonary:     (+) tobacco use, Current use,     Neurologic:       Cardiovascular:       METS/Exercise Tolerance:     Hematologic:       Musculoskeletal:       GI/Hepatic:       Renal/Genitourinary:       Endo:       Psychiatric/Substance Use:       Infectious Disease:       Malignancy:       Other:            Physical Exam    Airway        Mallampati: I    Neck ROM: full     Respiratory Devices and Support         Dental  no notable dental history     (+)  caps      Cardiovascular   cardiovascular exam normal          Pulmonary   pulmonary exam normal                OUTSIDE LABS:  CBC:   Lab Results   Component Value Date    WBC 5.4 10/13/2022    HGB 7.2 (L) 10/13/2022    HCT 24.5 (L) 10/13/2022     10/13/2022     BMP:   Lab Results   Component Value Date     10/13/2022    POTASSIUM 3.7 10/13/2022    CHLORIDE 107 10/13/2022    CO2 27 10/13/2022    BUN 9 10/13/2022    CR 0.56 10/13/2022     (H) 10/13/2022     COAGS: No results found for: PTT, INR, FIBR  POC: No results found for: BGM, HCG, HCGS  HEPATIC: No results found for: ALBUMIN, PROTTOTAL, ALT, AST, GGT, ALKPHOS, BILITOTAL, BILIDIRECT, ZEE  OTHER:   Lab Results   Component Value Date    SONIDO 8.4 (L) 10/13/2022    TSH 2.63 08/30/2022       Anesthesia Plan    ASA Status:  2   NPO Status:  NPO Appropriate    Anesthesia Type: General.     - Airway: ETT   Induction: Intravenous.   Maintenance: Balanced.   Techniques and Equipment:     - Airway: Video-Laryngoscope         Consents    Anesthesia Plan(s) and associated risks, benefits, and realistic alternatives discussed. Questions answered and patient/representative(s) expressed understanding.    - Discussed:     - Discussed with:  Patient         Postoperative Care    Pain management: IV analgesics.   PONV prophylaxis: Ondansetron (or other 5HT-3)     Comments:                Kwame Hall MD

## 2022-10-19 NOTE — ANESTHESIA PROCEDURE NOTES
Airway       Patient location during procedure: OR       Procedure Start/Stop Times: 10/19/2022 1:57 PM  Staff -        Anesthesiologist:  Kwame Hall MD       CRNA: Bill Roper APRN CRNA       Other Anesthesia Staff: Sandoval Elias       Performed By: SRNA  Consent for Airway        Urgency: elective  Indications and Patient Condition       Indications for airway management: mica-procedural       Induction type:intravenous       Mask difficulty assessment: 1 - vent by mask    Final Airway Details       Final airway type: endotracheal airway       Successful airway: ETT - single  Endotracheal Airway Details        ETT size (mm): 7.0       Cuffed: yes       Successful intubation technique: video laryngoscopy       VL Blade Size: Tracy 3       Grade View of Cords: 1       Adjucts: stylet       Position: Right       Measured from: gums/teeth       Secured at (cm): 22       Bite block used: None    Post intubation assessment        Placement verified by: capnometry, equal breath sounds and chest rise        Number of attempts at approach: 1       Secured with: silk tape       Ease of procedure: easy       Dentition: Intact and Unchanged    Medication(s) Administered   Medication Administration Time: 10/19/2022 1:57 PM

## 2022-10-19 NOTE — BRIEF OP NOTE
[facility]    Brief Operative Note    Pre-operative diagnosis: Uterine mass [N85.8]  Post-operative diagnosis Same as pre-operative diagnosis    Procedure: Procedure(s):  ROBOTIC ASSISTED MYOMECTOMY, POSSIBLE LAPAROTOMY  Surgeon: Surgeon(s) and Role:     * Chantel Mckoy MD - Primary  Anesthesia: General   Estimated Blood Loss: 25mL    Drains: None  Specimens:   ID Type Source Tests Collected by Time Destination   1 : Uterine Mass Tissue Other SURGICAL PATHOLOGY EXAM Chantel Mckoy MD 10/19/2022  4:08 PM      Findings:   Large intrauterine mass, initial pathology spindle cell neoplasm, 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 Normal apperaing bilateral fallopian tubes and ovaries. .  Complications: None.  Implants: * No implants in log *

## 2022-10-19 NOTE — PROGRESS NOTES
PTA medications updated by Medication Scribe prior to surgery via phone call with patient (last doses completed by Nurse)     Medication history sources: Patient, Surescripts and H&P  In the past week, patient estimated taking medication this percent of the time: Greater than 90%  Adherence assessment: N/A Not Observed    Significant changes made to the medication list:  Patient reports no longer taking the following meds (med scribe removed from PTA med list): Clonazepam      Additional medication history information:   None    Medication reconciliation completed by provider prior to medication history? No    Time spent in this activity: 40 minutes    The information provided in this note is only as accurate as the sources available at the time of update(s)    Prior to Admission medications    Medication Sig Last Dose Taking? Auth Provider Long Term End Date   acetaminophen (TYLENOL) 500 MG tablet Take 1,500 mg by mouth once (3 x 500 mg = 1,500 mg) 10/19/2022 at am Yes Reported, Patient     desvenlafaxine (PRISTIQ) 50 MG 24 hr tablet Take 50 mg by mouth every evening 10/18/2022 at pm Yes Reported, Patient Yes    ferrous sulfate (FEROSUL) 325 (65 Fe) MG tablet Take 650 mg by mouth every evening 10/18/2022 at pm Yes Reported, Patient     folic acid (FOLVITE) 1 MG tablet Take 1 mg by mouth every evening 10/18/2022 at pm Yes Reported, Patient     methylphenidate (RITALIN) 20 MG tablet Take 30 mg by mouth 3 times daily (with meals) (1.5 x 20 mg = 30 mg)  at prn Yes Reported, Patient     Multiple Vitamin (MULTIVITAMIN PO) Take 1 capsule by mouth daily 10/18/2022 at pm Yes Reported, Patient     ondansetron (ZOFRAN ODT) 4 MG ODT tab Take 4 mg by mouth every 8 hours as needed for nausea  at prn Yes Reported, Patient     oxyCODONE-acetaminophen (PERCOCET) 5-325 MG tablet Take 1 tablet by mouth every 6 hours as needed for severe pain  at prn Yes Reported, Patient     sertraline (ZOLOFT) 100 MG tablet Take 200 mg by mouth  every evening (2 x 100 mg = 200 mg) 10/18/2022 at pm Yes Reported, Patient Yes    sulfaSALAzine (AZULFIDINE) 500 MG tablet Take 1,000 mg by mouth every evening (2 x 500 mg = 1,000 mg) 10/18/2022 at pm Yes Reported, Patient     tranexamic acid (LYSTEDA) 650 MG tablet Take 1,300 mg by mouth 3 times daily (2 x 650 mg = 1,300 mg) 10/19/2022 at am Yes Reported, Patient     traZODone (DESYREL) 100 MG tablet Take 50 mg by mouth At Bedtime (0.5 x 100 mg = 50 mg) 10/18/2022 at pm Yes Reported, Patient Yes    zolpidem (AMBIEN) 10 MG tablet Take 10 mg by mouth nightly as needed for sleep 10/18/2022 at prn Yes Reported, Patient       Medication history completed by:    Tulio Sanchez CPhT  Medication [facility]

## 2022-10-19 NOTE — INTERVAL H&P NOTE
"I have reviewed the surgical (or preoperative) H&P that is linked to this encounter, and examined the patient. There are no significant changes    Clinical Conditions Present on Arrival:  Clinically Significant Risk Factors Present on Admission                    # Obesity: Estimated body mass index is 33.84 kg/m  as calculated from the following:    Height as of this encounter: 1.575 m (5' 2\").    Weight as of this encounter: 83.9 kg (185 lb).       "

## 2022-10-19 NOTE — ANESTHESIA CARE TRANSFER NOTE
Patient: Audelia Hernandez    Procedure: Procedure(s):  ROBOTIC ASSISTED MYOMECTOMY       Diagnosis: Uterine mass [N85.8]  Diagnosis Additional Information: No value filed.    Anesthesia Type:   General     Note:    Oropharynx: oropharynx clear of all foreign objects and spontaneously breathing  Level of Consciousness: drowsy  Oxygen Supplementation: face mask  Level of Supplemental Oxygen (L/min / FiO2): 6  Independent Airway: airway patency satisfactory and stable  Dentition: dentition unchanged  Vital Signs Stable: post-procedure vital signs reviewed and stable  Report to RN Given: handoff report given  Patient transferred to: PACU    Handoff Report: Identifed the Patient, Identified the Reponsible Provider, Reviewed the pertinent medical history, Discussed the surgical course, Reviewed Intra-OP anesthesia mangement and issues during anesthesia, Set expectations for post-procedure period and Allowed opportunity for questions and acknowledgement of understanding      Vitals:  Vitals Value Taken Time   /73 10/19/22 1655   Temp     Pulse 86 10/19/22 1700   Resp 9 10/19/22 1700   SpO2 100 % 10/19/22 1700   Vitals shown include unvalidated device data.    Electronically Signed By: ALLEN Man CRNA  October 19, 2022  5:02 PM

## 2022-10-19 NOTE — ANESTHESIA POSTPROCEDURE EVALUATION
Patient: Audelia Hernandez    Procedure: Procedure(s):  ROBOTIC ASSISTED MYOMECTOMY       Anesthesia Type:  General    Note:  Disposition: Outpatient   Postop Pain Control: Uneventful            Sign Out: Well controlled pain   PONV: No   Neuro/Psych: Uneventful            Sign Out: Acceptable/Baseline neuro status   Airway/Respiratory: Uneventful            Sign Out: Acceptable/Baseline resp. status   CV/Hemodynamics: Uneventful            Sign Out: Acceptable CV status; No obvious hypovolemia; No obvious fluid overload   Other NRE: NONE   DID A NON-ROUTINE EVENT OCCUR? No           Last vitals:  Vitals Value Taken Time   /82 10/19/22 1830   Temp 36.9  C (98.4  F) 10/19/22 1800   Pulse 78 10/19/22 1833   Resp 6 10/19/22 1833   SpO2 93 % 10/19/22 1833   Vitals shown include unvalidated device data.    Electronically Signed By: Isael Burleson MD  October 19, 2022  6:54 PM

## 2022-10-20 VITALS
HEART RATE: 84 BPM | SYSTOLIC BLOOD PRESSURE: 112 MMHG | OXYGEN SATURATION: 98 % | DIASTOLIC BLOOD PRESSURE: 63 MMHG | RESPIRATION RATE: 18 BRPM

## 2022-10-20 RX ORDER — PHENAZOPYRIDINE HYDROCHLORIDE 100 MG/1
100 TABLET, FILM COATED ORAL 3 TIMES DAILY PRN
Qty: 6 TABLET | Refills: 0 | Status: SHIPPED | OUTPATIENT
Start: 2022-10-20 | End: 2022-10-22

## 2022-10-20 RX ORDER — NITROFURANTOIN 25; 75 MG/1; MG/1
100 CAPSULE ORAL 2 TIMES DAILY
Qty: 10 CAPSULE | Refills: 0 | Status: SHIPPED | OUTPATIENT
Start: 2022-10-20 | End: 2022-10-25

## 2022-10-20 NOTE — ED PROVIDER NOTES
"  History   Chief Complaint:  Post-op Problem    The history is provided by the patient.      Audelia Hernandez is a 36 year old female with history of fibroid, ulcerative colitis, anxiety, and depression who presents with post-op problem. The patient had a fibroid assisted myomectomy earlier today and has been experiencing urinary urgency, frequency, and dysuria since she was discharged at 1945. States that her discomfort feels like a \"UTI on steroids\" and that she is also experiencing abdominal discomfort near her incision sites because she gets up to use the bathroom so frequently. Adds that she has been able to urinate in small amounts.  Denies skin changes.     Review of Systems   Gastrointestinal: Positive for abdominal pain.   Genitourinary: Positive for decreased urine volume, dysuria, frequency and urgency.   All other systems reviewed and are negative.    Allergies:  No Known Allergies    Medications:  Celecoxib   Oxycodone  Senna  Desvenlafaxine   Ferrous sulfate  Methylphenidate  Ondansetron  Percocet  Sertraline  Sulfasalazine  Trazodone  Zolpidem     Past Medical History:     Fibroid   Menometrorrhagia   Iron deficiency anemia  Anxiety  Depression  Ulcerative colitis   Headache  Hematochezia  Urinary frequency  Insomnia  OCD  ADHD  Agoraphobia with panic disorder  Opioid type dependence  Acute blood loss anemia    Past Surgical History:     section  [place] teeth extraction  Fibroid removal     Family History:    Father- type II diabetes  Mother- anxiety  Brother- ADHD    Social History:  Presents with her mother  Presents via private vehicle     Physical Exam     Patient Vitals for the past 24 hrs:   BP Pulse Resp SpO2   10/20/22 0030 -- 84 18 98 %   10/20/22 0028 112/63 86 -- --       Physical Exam  General: Alert and cooperative with exam. Patient in mild to moderate distress. Normal mentation.  Head:  Scalp is NC/AT  Eyes:  No scleral icterus, PERRL  ENT:  The external nose and ears are " normal.   Neck:  Normal range of motion without rigidity.  CV:  Regular rate  Resp:  Non-labored, no retractions or accessory muscle use  GI:  Abdomen is soft, no distension, mild diffuse tenderness. No peritoneal signs. Surgical incision C/D/I.  MS:  No lower extremity edema   Skin:  Warm and dry, No rash or lesions noted.  Neuro: Oriented x 3. No gross motor deficits.    Emergency Department Course   Laboratory:  Labs Ordered and Resulted from Time of ED Arrival to Time of ED Departure   ROUTINE UA WITH MICROSCOPIC REFLEX TO CULTURE - Abnormal       Result Value    Color Urine Dark Yellow (*)     Appearance Urine Clear      Glucose Urine Negative      Bilirubin Urine Negative      Ketones Urine Negative      Specific Gravity Urine 1.007      Blood Urine Small (*)     pH Urine 7.0      Protein Albumin Urine Negative      Urobilinogen Urine Normal      Nitrite Urine Positive (*)     Leukocyte Esterase Urine Negative      RBC Urine 0      WBC Urine 1      Squamous Epithelials Urine <1     URINE CULTURE     Emergency Department Course:  Reviewed:  I reviewed nursing notes, vitals, past medical history and Care Everywhere    Assessments:  2257 I obtained history and examined the patient as noted above.   0004 I rechecked and updated the patient.     Interventions:  2313 Pyridium, 100 mg, PO    Disposition:  The patient was discharged to home.     Impression & Plan   Medical Decision Making:  Audelia Hernandez is a 36 year old female who presents for evaluation of urinary frequency and dysuria; underwent robotic fibroid removal earlier today.  On evaluation patient has mild abdominal tenderness and incision sites are without concerning finding.  Abdominal exam is as expected given surgical procedure earlier today.  UA was obtained and is nitrite positive without other significant signs of infection; patient reports symptoms feel similar to previous UTIs.  Given significant reported urinary symptoms, I did elect to  initiate patient on Macrobid and provided prescription for Pyridium.  Urine sent for culture. Return if increasing pain, vomiting, fever, or inability to tolerate the oral antibiotic.  Follow up with primary physician is indicated if not improving in 2-3 days.       Diagnosis:    ICD-10-CM    1. Dysuria  R30.0       2. Acute post-operative pain  G89.18         Discharge Medications:  New Prescriptions    NITROFURANTOIN MACROCRYSTAL-MONOHYDRATE (MACROBID) 100 MG CAPSULE    Take 1 capsule (100 mg) by mouth 2 times daily for 5 days    PHENAZOPYRIDINE (PYRIDIUM) 100 MG TABLET    Take 1 tablet (100 mg) by mouth 3 times daily as needed for urinary tract discomfort     Scribe Disclosure:  I, Betty Fishman, am serving as a scribe at 10:45 PM on 10/19/2022 to document services personally performed by Ilia Mcdonnell DO based on my observations and the provider's statements to me.      Ilia Mcdonnell DO  10/20/22 0715

## 2022-10-20 NOTE — OP NOTE
Procedure Date: 10/19/2022    PREOPERATIVE DIAGNOSES:  1.  Menorrhagia.  2.  Blood loss anemia and uterine fibroids.    POSTOPERATIVE DIAGNOSES:    1.  Menorrhagia.  2.  Blood loss anemia and uterine fibroids.    SURGEON:  Chantel Mckoy MD    ASSISTANT:  Luanne Roberts PA-C    ANESTHESIA:  General endotracheal anesthesia.    PROCEDURE PERFORMED:  Robotic-assisted laparoscopic myomectomy minilaparotomy.    INDICATIONS FOR PROCEDURE:  The patient is a 36-year-old female with a 2-4 month history of progressive menorrhagia clot formation and daily bleeding with some pelvic discomfort.  A pelvic ultrasound was obtained 09/12/2002 and revealed an enlarged uterus 11.7 x 9.5 x 9.5 containing an 8.2 cm intramural fibroid and a 1.3 cm endometrium.  Her ovaries were normal bilaterally.  A pelvic MRI was obtained 09/22/2002 and again confirmed an 8.7 cm heterogeneous mass, likely arising from the anterior and slightly left of the uterine myometrium, normal bilateral ovaries, thickened peripheral enhancement of the myometrium, a T1 hyperintense signal suggesting hemorrhagic byproducts possible red degeneration of a large fibroid, but uterine leiomyosarcoma could not be excluded.  There was no pelvic lymphadenopathy.  She had a consult with Dr. Cortes on 09/20/2022.  She had previously been on Provera, but Provera made it worse.  She had been put on Lystdea which helped more, but on that day, she had worked with excruciating pain and heavy bleeding.  Surgical and nonsurgical options were discussed.  She was interested in future fertility.  An endometrial biopsy was obtained and revealed blood clot was scant benign endometrial tissue with evidence of glandular and stromal breakdown.  She was subsequently referred to me because of the concern for leiomyosarcoma.  She did want uterine preservation if at all possible and I felt that this could very well be a leiomyoma given her age.  She was brought to the operating room for  definitive surgery today.  Prior to her surgery, she had the onset of heavy bleeding where she bled down to a hemoglobin of 6 and over the last week received 2 units of packed red blood cells with her hemoglobin preoperatively being 9.1.    FINDINGS:  The patient did have an enlarged uterus.  There was what appeared to be a fibroid anteriorly and on the left, her tubes and ovaries appeared normal.  It looked like she had a lot of retrograde bleeding into her peritoneal cavity with some residual blood when we first went in and hemosiderin deposits.  Her fallopian tubes appeared normal, especially the fimbriated ends there was no hydrosalpinx.  Her uterus was generous in size and looked like it may have adenomyosis going.    DESCRIPTION OF PROCEDURE:  The patient was taken to the operating room.  She received broad spectrum antibiotic prophylaxis.  Knee high sequential compression devices were placed on her lower extremities.  She was brought to the operating room and placed in a supine position on a pink pad on the operating table.  General endotracheal anesthesia was administered in the usual fashion.  Once intubated, she was repositioned in low lithotomy position using well-padded Yellofin stirrups.  Her arms were padded and held at her side with draw sheets over her arms and hands.  Shoulder braces were applied to her shoulders.  A Paul was placed above her forehead.  She was prepped and draped in the usual sterile fashion.  A timeout was conducted and everyone agreed upon the procedure.  I started by infiltrating the supraumbilical area 23 cm above the symphysis pubis in the midline with 0.5% Marcaine with epinephrine.  I made a small 8 mm incision and through this incision and inserted a Veress needle into the abdominal cavity.  Opening pressure was 1 mmHg.  The abdomen was insufflated with carbon dioxide to create a diffuse pneumoperitoneum.  Pressure limits were set and maintained at or below 15 mmHg  throughout the case.  With establishment of the pneumoperitoneum, the Veress needle was exchanged for an 8 mm da Lidia trocar.  Once this was in place, the camera was introduced through the trocar, confirming intraperitoneal position.  She did have filmy adhesions of the omentum to the anterior abdominal wall in the lower abdomen.  She had some hemosiderin deposits and some free blood, probably from the heavy bleeding that she had in this previous week.  Under direct visualization, 4 additional port sites were placed, an 8 mm da Lidia port was placed 8 cm to the right of the camera port in the upper abdomen, two 8 mm da Lidia ports were placed 7 cm and 14 cm to the left of the camera port in the upper abdomen.  She was then placed in 33 degrees of Trendelenburg with gravitational displacement of her bowel into the upper abdomen.  Luanne Roberts placed an 8 mm AirSeal port above the right anterior superior iliac spine.  I did use a spinal needle in a controlled release syringe.  I had the staff mixup 20 units of Pitressin and 40 mL of injectable saline.  I utilized the spinal needle through the lower abdominal wall.  I injected the uterus in 3 areas with the Pitressin solution using a total of 30 mL of the solution.  Once this was in place, we docked the robot.  Monopolar scissors were placed in the right upper robotic arm, a [place] bipolar in the medial left upper robotic arm and a ProGrasp in the lateral left robotic arm.  These instruments were advanced into the pelvis.  Using the monopolar scissors, I made an anterior incision over what appeared to be the fibroid.  I dissected the myometrium down to the fibroid and then started dissecting the walls away from the fibroid.  This fibroid was soft in consistency and not like hard like a normal fibroid and it did not have a good dissecting planes.  With countertraction and eventually switching to da Lidia tenaculum to remove the fibroid and I used the ProGrasp to  move the uterine wall, I was able to come under the fibroid and free it up.  There was one area that I thought we had gotten into the endometrial cavity.  Once the fibroid was removed.  I oversewed that area with 3-0 silk suture on an SH needle in a running fashion.  The remainder of the myometrium was closed in at least 2-layer closure using a 0 Vicryl suture on a CT2 needle.  The first to reapproximate the myometrium and the second to reapproximate the serosa.  If she conceived in the future, she will need to consider a  section as mode of delivery.  Luanne Roberts exchange the 8 mm AirSeal for a 15 mm [employer] trocar.  Through this, we introduced the 15 mm anchor bag and once it was opened, we placed the specimen within it Luanne cinched it down and removed that along with the [employer] trocar.  We exteriorized the bag I increased the incision slightly using Calvert scissors and brought the bag out, so that I could see the mass within it.  I used a combination of ring forceps and Kocher as well as a #10 scalpel blade, which we continued to change out to morcellate the specimen in the bag and it was morcellated and sent off to pathology once the bag was completely removed.  I closed the peritoneum with 2-0 Vicryl suture on an SH needle.  I closed the fascia with 0 Vicryl suture in two figure-of-eight sutures that were interrupted Luanne oRberts reapproximated the subcutaneous tissue with 4-0 Monocryl in an interrupted fashion and then closed the skin in a subcuticular fashion.  The robot was undocked.  Prior to us morcellated the specimen, the instruments were removed.  The robot was undocked.  The pneumoperitoneum remained until we finished removing the specimen and then we allowed the pneumoperitoneum dissipate.  The trocars were removed intact.  The upper 4 incisions were closed with 4-0 Monocryl in an interrupted fashion to reapproximate the subcutaneous tissue and 4-0 Monocryl in a  subcuticular fashion to reapproximate the skin.  Benzoin was placed around the incision.  Steri-Strips laid over the incisions.  Her anesthesia was reversed.  She was extubated and taken to recovery room in stable condition.     ESTIMATED BLOOD LOSS FOR THE PROCEDURE:  25 mL    Luanne Roberts PA-C, was my primary assist.  She helped me with all aspects of the case, including trocar placement, docking the robot, placement of the robotic instruments.  She assisted through the accessory port site, providing necessary countertraction, optimizing visualization and suctioning and irrigating when necessary.  She was instrumental in helping, we morcellated the specimen once it was placed in the bag and she helped me with undocking the robot and port site closure.    Chantel Mckoy MD        D: 10/19/2022   T: 10/19/2022   MT: MIS    Name:     DAIN GRAY  MRN:      [id number]        Account:        [id number]   :      1985           Procedure Date: 10/19/2022     Document: [id number]    cc:  MD Thania Cerda MD

## 2022-10-20 NOTE — ED TRIAGE NOTES
Patient was discharged from same day surgery after having a fibroid removed. She presents to the ED with complaints of urgency, frequency and burning with urination that began shortly after discharge. Patient is asking for something to take away the discomfort so she can relax     Triage Assessment     Row Name 10/19/22 4676       Triage Assessment (Adult)    Airway WDL WDL       Respiratory WDL    Respiratory WDL WDL       Skin Circulation/Temperature WDL    Skin Circulation/Temperature WDL WDL       Cardiac WDL    Cardiac WDL WDL       Peripheral/Neurovascular WDL    Peripheral Neurovascular WDL WDL       Cognitive/Neuro/Behavioral WDL    Cognitive/Neuro/Behavioral WDL WDL

## 2022-10-21 LAB
BACTERIA UR CULT: NO GROWTH
PATH REPORT.COMMENTS IMP SPEC: NORMAL
PATH REPORT.COMMENTS IMP SPEC: NORMAL
PATH REPORT.FINAL DX SPEC: NORMAL
PATH REPORT.GROSS SPEC: NORMAL
PATH REPORT.INTRAOP OBS SPEC DOC: NORMAL
PATH REPORT.MICROSCOPIC SPEC OTHER STN: NORMAL
PATH REPORT.RELEVANT HX SPEC: NORMAL
PHOTO IMAGE: NORMAL

## 2022-10-27 ENCOUNTER — HOSPITAL ENCOUNTER (OUTPATIENT)
Dept: CT IMAGING | Facility: CLINIC | Age: 37
Discharge: HOME | End: 2022-10-27
Attending: OBSTETRICS & GYNECOLOGY | Admitting: OBSTETRICS & GYNECOLOGY
Payer: COMMERCIAL

## 2022-10-27 DIAGNOSIS — R06.02 SHORTNESS OF BREATH: ICD-10-CM

## 2022-10-27 DIAGNOSIS — R10.84 GENERALIZED ABDOMINAL PAIN: ICD-10-CM

## 2022-10-27 PROCEDURE — 250N000011 HC RX IP 250 OP 636: Performed by: OBSTETRICS & GYNECOLOGY

## 2022-10-27 PROCEDURE — 74177 CT ABD & PELVIS W/CONTRAST: CPT

## 2022-10-27 PROCEDURE — 250N000009 HC RX 250: Performed by: OBSTETRICS & GYNECOLOGY

## 2022-10-27 RX ORDER — IOPAMIDOL 755 MG/ML
96 INJECTION, SOLUTION INTRAVASCULAR ONCE
Status: COMPLETED | OUTPATIENT
Start: 2022-10-27 | End: 2022-10-27

## 2022-10-27 RX ADMIN — IOPAMIDOL 96 ML: 755 INJECTION, SOLUTION INTRAVENOUS at 15:07

## 2022-10-27 RX ADMIN — SODIUM CHLORIDE 93 ML: 9 INJECTION, SOLUTION INTRAVENOUS at 15:07

## 2022-11-13 ENCOUNTER — HOSPITAL ENCOUNTER (OUTPATIENT)
Facility: CLINIC | Age: 37
Setting detail: OBSERVATION
Discharge: HOME | End: 2022-11-15
Attending: EMERGENCY MEDICINE | Admitting: HOSPITALIST
Payer: COMMERCIAL

## 2022-11-13 DIAGNOSIS — R11.0 NAUSEA: Primary | ICD-10-CM

## 2022-11-13 DIAGNOSIS — R94.31 PROLONGED Q-T INTERVAL ON ECG: ICD-10-CM

## 2022-11-13 DIAGNOSIS — K21.9 GASTROESOPHAGEAL REFLUX DISEASE, UNSPECIFIED WHETHER ESOPHAGITIS PRESENT: ICD-10-CM

## 2022-11-13 DIAGNOSIS — R10.2 PELVIC PAIN IN FEMALE: ICD-10-CM

## 2022-11-13 DIAGNOSIS — R21 RASH: ICD-10-CM

## 2022-11-13 DIAGNOSIS — E83.42 HYPOMAGNESEMIA: ICD-10-CM

## 2022-11-13 DIAGNOSIS — R42 DIZZINESS: ICD-10-CM

## 2022-11-13 DIAGNOSIS — N93.9 EPISODE OF HEAVY VAGINAL BLEEDING: ICD-10-CM

## 2022-11-13 DIAGNOSIS — R10.84 ABDOMINAL PAIN, GENERALIZED: ICD-10-CM

## 2022-11-13 DIAGNOSIS — E87.6 HYPOKALEMIA: ICD-10-CM

## 2022-11-13 DIAGNOSIS — D64.9 ANEMIA, UNSPECIFIED TYPE: ICD-10-CM

## 2022-11-13 PROCEDURE — 99285 EMERGENCY DEPT VISIT HI MDM: CPT | Mod: 25

## 2022-11-13 PROCEDURE — C9803 HOPD COVID-19 SPEC COLLECT: HCPCS

## 2022-11-13 PROCEDURE — 93005 ELECTROCARDIOGRAM TRACING: CPT

## 2022-11-14 ENCOUNTER — APPOINTMENT (OUTPATIENT)
Dept: CT IMAGING | Facility: CLINIC | Age: 37
End: 2022-11-14
Attending: EMERGENCY MEDICINE
Payer: COMMERCIAL

## 2022-11-14 LAB
ABO/RH(D): NORMAL
ALBUMIN SERPL-MCNC: 3.3 G/DL (ref 3.4–5)
ALP SERPL-CCNC: 73 U/L (ref 40–150)
ALT SERPL W P-5'-P-CCNC: 47 U/L (ref 0–50)
ANION GAP SERPL CALCULATED.3IONS-SCNC: 9 MMOL/L (ref 3–14)
ANTIBODY SCREEN: NEGATIVE
AST SERPL W P-5'-P-CCNC: 20 U/L (ref 0–45)
ATRIAL RATE - MUSE: 85 BPM
BASOPHILS # BLD AUTO: 0 10E3/UL (ref 0–0.2)
BASOPHILS NFR BLD AUTO: 0 %
BILIRUB SERPL-MCNC: 0.7 MG/DL (ref 0.2–1.3)
BUN SERPL-MCNC: 5 MG/DL (ref 7–30)
CALCIUM SERPL-MCNC: 8.6 MG/DL (ref 8.5–10.1)
CHLORIDE BLD-SCNC: 108 MMOL/L (ref 94–109)
CO2 SERPL-SCNC: 23 MMOL/L (ref 20–32)
CREAT SERPL-MCNC: 0.5 MG/DL (ref 0.52–1.04)
DIASTOLIC BLOOD PRESSURE - MUSE: NORMAL MMHG
EOSINOPHIL # BLD AUTO: 0.2 10E3/UL (ref 0–0.7)
EOSINOPHIL NFR BLD AUTO: 2 %
ERYTHROCYTE [DISTWIDTH] IN BLOOD BY AUTOMATED COUNT: 14.2 % (ref 10–15)
FLUAV RNA SPEC QL NAA+PROBE: NEGATIVE
FLUBV RNA RESP QL NAA+PROBE: NEGATIVE
GFR SERPL CREATININE-BSD FRML MDRD: >90 ML/MIN/1.73M2
GLUCOSE BLD-MCNC: 119 MG/DL (ref 70–99)
HCG SERPL QL: NEGATIVE
HCT VFR BLD AUTO: 26.4 % (ref 35–47)
HGB BLD-MCNC: 8.3 G/DL (ref 11.7–15.7)
HGB BLD-MCNC: 8.8 G/DL (ref 11.7–15.7)
HGB BLD-MCNC: 9.2 G/DL (ref 11.7–15.7)
HOLD SPECIMEN: NORMAL
IMM GRANULOCYTES # BLD: 0 10E3/UL
IMM GRANULOCYTES NFR BLD: 0 %
INTERPRETATION ECG - MUSE: NORMAL
LYMPHOCYTES # BLD AUTO: 2.5 10E3/UL (ref 0.8–5.3)
LYMPHOCYTES NFR BLD AUTO: 31 %
MAGNESIUM SERPL-MCNC: 1.5 MG/DL (ref 1.6–2.3)
MAGNESIUM SERPL-MCNC: 2.3 MG/DL (ref 1.6–2.3)
MCH RBC QN AUTO: 28.2 PG (ref 26.5–33)
MCHC RBC AUTO-ENTMCNC: 33.3 G/DL (ref 31.5–36.5)
MCV RBC AUTO: 85 FL (ref 78–100)
MONOCYTES # BLD AUTO: 0.5 10E3/UL (ref 0–1.3)
MONOCYTES NFR BLD AUTO: 7 %
NEUTROPHILS # BLD AUTO: 4.7 10E3/UL (ref 1.6–8.3)
NEUTROPHILS NFR BLD AUTO: 60 %
NRBC # BLD AUTO: 0 10E3/UL
NRBC BLD AUTO-RTO: 0 /100
P AXIS - MUSE: 66 DEGREES
PLATELET # BLD AUTO: 386 10E3/UL (ref 150–450)
POTASSIUM BLD-SCNC: 2.6 MMOL/L (ref 3.4–5.3)
POTASSIUM BLD-SCNC: 3.4 MMOL/L (ref 3.4–5.3)
POTASSIUM BLD-SCNC: 3.5 MMOL/L (ref 3.4–5.3)
PR INTERVAL - MUSE: 150 MS
PROT SERPL-MCNC: 6.5 G/DL (ref 6.8–8.8)
QRS DURATION - MUSE: 96 MS
QT - MUSE: 406 MS
QTC - MUSE: 483 MS
R AXIS - MUSE: 23 DEGREES
RBC # BLD AUTO: 3.12 10E6/UL (ref 3.8–5.2)
RSV RNA SPEC NAA+PROBE: NEGATIVE
SARS-COV-2 RNA RESP QL NAA+PROBE: NEGATIVE
SODIUM SERPL-SCNC: 140 MMOL/L (ref 133–144)
SPECIMEN EXPIRATION DATE: NORMAL
SYSTOLIC BLOOD PRESSURE - MUSE: NORMAL MMHG
T AXIS - MUSE: 34 DEGREES
VENTRICULAR RATE- MUSE: 85 BPM
WBC # BLD AUTO: 8.1 10E3/UL (ref 4–11)

## 2022-11-14 PROCEDURE — 74177 CT ABD & PELVIS W/CONTRAST: CPT

## 2022-11-14 PROCEDURE — 84132 ASSAY OF SERUM POTASSIUM: CPT | Mod: 91 | Performed by: HOSPITALIST

## 2022-11-14 PROCEDURE — 250N000009 HC RX 250: Performed by: EMERGENCY MEDICINE

## 2022-11-14 PROCEDURE — 96375 TX/PRO/DX INJ NEW DRUG ADDON: CPT | Mod: XU

## 2022-11-14 PROCEDURE — 258N000003 HC RX IP 258 OP 636: Performed by: HOSPITALIST

## 2022-11-14 PROCEDURE — 96361 HYDRATE IV INFUSION ADD-ON: CPT

## 2022-11-14 PROCEDURE — 83735 ASSAY OF MAGNESIUM: CPT | Performed by: EMERGENCY MEDICINE

## 2022-11-14 PROCEDURE — 36415 COLL VENOUS BLD VENIPUNCTURE: CPT | Performed by: EMERGENCY MEDICINE

## 2022-11-14 PROCEDURE — 250N000011 HC RX IP 250 OP 636: Performed by: EMERGENCY MEDICINE

## 2022-11-14 PROCEDURE — 250N000013 HC RX MED GY IP 250 OP 250 PS 637: Performed by: HOSPITALIST

## 2022-11-14 PROCEDURE — 96376 TX/PRO/DX INJ SAME DRUG ADON: CPT

## 2022-11-14 PROCEDURE — G0378 HOSPITAL OBSERVATION PER HR: HCPCS

## 2022-11-14 PROCEDURE — 80053 COMPREHEN METABOLIC PANEL: CPT | Performed by: EMERGENCY MEDICINE

## 2022-11-14 PROCEDURE — 36415 COLL VENOUS BLD VENIPUNCTURE: CPT | Performed by: HOSPITALIST

## 2022-11-14 PROCEDURE — 85025 COMPLETE CBC W/AUTO DIFF WBC: CPT | Performed by: EMERGENCY MEDICINE

## 2022-11-14 PROCEDURE — 84703 CHORIONIC GONADOTROPIN ASSAY: CPT | Performed by: EMERGENCY MEDICINE

## 2022-11-14 PROCEDURE — 96366 THER/PROPH/DIAG IV INF ADDON: CPT

## 2022-11-14 PROCEDURE — 96375 TX/PRO/DX INJ NEW DRUG ADDON: CPT

## 2022-11-14 PROCEDURE — 250N000013 HC RX MED GY IP 250 OP 250 PS 637: Performed by: EMERGENCY MEDICINE

## 2022-11-14 PROCEDURE — 86901 BLOOD TYPING SEROLOGIC RH(D): CPT | Performed by: EMERGENCY MEDICINE

## 2022-11-14 PROCEDURE — 83735 ASSAY OF MAGNESIUM: CPT | Performed by: HOSPITALIST

## 2022-11-14 PROCEDURE — 250N000011 HC RX IP 250 OP 636: Performed by: HOSPITALIST

## 2022-11-14 PROCEDURE — 96365 THER/PROPH/DIAG IV INF INIT: CPT | Mod: 59

## 2022-11-14 PROCEDURE — 86850 RBC ANTIBODY SCREEN: CPT | Performed by: EMERGENCY MEDICINE

## 2022-11-14 PROCEDURE — 99220 PR INITIAL OBSERVATION CARE,LEVEL III: CPT | Performed by: HOSPITALIST

## 2022-11-14 PROCEDURE — 85018 HEMOGLOBIN: CPT | Mod: 59 | Performed by: HOSPITALIST

## 2022-11-14 PROCEDURE — 87637 SARSCOV2&INF A&B&RSV AMP PRB: CPT | Performed by: EMERGENCY MEDICINE

## 2022-11-14 PROCEDURE — 84132 ASSAY OF SERUM POTASSIUM: CPT | Performed by: EMERGENCY MEDICINE

## 2022-11-14 PROCEDURE — 96376 TX/PRO/DX INJ SAME DRUG ADON: CPT | Mod: XU

## 2022-11-14 PROCEDURE — 258N000003 HC RX IP 258 OP 636: Performed by: EMERGENCY MEDICINE

## 2022-11-14 RX ORDER — ACETAMINOPHEN 650 MG/1
650 SUPPOSITORY RECTAL EVERY 6 HOURS PRN
Status: DISCONTINUED | OUTPATIENT
Start: 2022-11-14 | End: 2022-11-15 | Stop reason: HOSPADM

## 2022-11-14 RX ORDER — IBUPROFEN 800 MG/1
800 TABLET, FILM COATED ORAL EVERY 6 HOURS PRN
Status: ON HOLD | COMMUNITY
End: 2022-11-15

## 2022-11-14 RX ORDER — MOMETASONE FUROATE MONOHYDRATE 50 UG/1
2 SPRAY, METERED NASAL EVERY EVENING
COMMUNITY

## 2022-11-14 RX ORDER — MORPHINE SULFATE 4 MG/ML
4 INJECTION, SOLUTION INTRAMUSCULAR; INTRAVENOUS
Status: COMPLETED | OUTPATIENT
Start: 2022-11-14 | End: 2022-11-15

## 2022-11-14 RX ORDER — ONDANSETRON 2 MG/ML
4 INJECTION INTRAMUSCULAR; INTRAVENOUS ONCE
Status: COMPLETED | OUTPATIENT
Start: 2022-11-14 | End: 2022-11-14

## 2022-11-14 RX ORDER — MORPHINE SULFATE 4 MG/ML
4 INJECTION, SOLUTION INTRAMUSCULAR; INTRAVENOUS ONCE
Status: COMPLETED | OUTPATIENT
Start: 2022-11-14 | End: 2022-11-14

## 2022-11-14 RX ORDER — ACETAMINOPHEN 325 MG/1
650 TABLET ORAL EVERY 6 HOURS PRN
Status: DISCONTINUED | OUTPATIENT
Start: 2022-11-14 | End: 2022-11-15 | Stop reason: HOSPADM

## 2022-11-14 RX ORDER — LORAZEPAM 1 MG/1
1 TABLET ORAL ONCE
Status: COMPLETED | OUTPATIENT
Start: 2022-11-14 | End: 2022-11-14

## 2022-11-14 RX ORDER — POTASSIUM CHLORIDE 7.45 MG/ML
10 INJECTION INTRAVENOUS
Status: COMPLETED | OUTPATIENT
Start: 2022-11-14 | End: 2022-11-14

## 2022-11-14 RX ORDER — SODIUM CHLORIDE 9 MG/ML
INJECTION, SOLUTION INTRAVENOUS CONTINUOUS
Status: DISCONTINUED | OUTPATIENT
Start: 2022-11-14 | End: 2022-11-15 | Stop reason: HOSPADM

## 2022-11-14 RX ORDER — MAGNESIUM SULFATE HEPTAHYDRATE 40 MG/ML
2 INJECTION, SOLUTION INTRAVENOUS ONCE
Status: COMPLETED | OUTPATIENT
Start: 2022-11-14 | End: 2022-11-14

## 2022-11-14 RX ORDER — AMOXICILLIN 250 MG
1 CAPSULE ORAL 2 TIMES DAILY PRN
Status: DISCONTINUED | OUTPATIENT
Start: 2022-11-14 | End: 2022-11-15 | Stop reason: HOSPADM

## 2022-11-14 RX ORDER — AMOXICILLIN 250 MG
2 CAPSULE ORAL 2 TIMES DAILY PRN
Status: DISCONTINUED | OUTPATIENT
Start: 2022-11-14 | End: 2022-11-15 | Stop reason: HOSPADM

## 2022-11-14 RX ORDER — ONDANSETRON 2 MG/ML
4 INJECTION INTRAMUSCULAR; INTRAVENOUS EVERY 6 HOURS PRN
Status: CANCELLED | OUTPATIENT
Start: 2022-11-14

## 2022-11-14 RX ORDER — HYDROMORPHONE HCL IN WATER/PF 6 MG/30 ML
0.2 PATIENT CONTROLLED ANALGESIA SYRINGE INTRAVENOUS
Status: DISCONTINUED | OUTPATIENT
Start: 2022-11-14 | End: 2022-11-15 | Stop reason: HOSPADM

## 2022-11-14 RX ORDER — CEPHALEXIN 500 MG/1
500 CAPSULE ORAL 4 TIMES DAILY
Status: ON HOLD | COMMUNITY
End: 2022-11-15

## 2022-11-14 RX ORDER — ONDANSETRON 4 MG/1
4 TABLET, ORALLY DISINTEGRATING ORAL EVERY 6 HOURS PRN
Status: CANCELLED | OUTPATIENT
Start: 2022-11-14

## 2022-11-14 RX ORDER — SODIUM CHLORIDE, SODIUM LACTATE, POTASSIUM CHLORIDE, CALCIUM CHLORIDE 600; 310; 30; 20 MG/100ML; MG/100ML; MG/100ML; MG/100ML
INJECTION, SOLUTION INTRAVENOUS CONTINUOUS
Status: DISCONTINUED | OUTPATIENT
Start: 2022-11-14 | End: 2022-11-15 | Stop reason: HOSPADM

## 2022-11-14 RX ORDER — IOPAMIDOL 755 MG/ML
91 INJECTION, SOLUTION INTRAVASCULAR ONCE
Status: COMPLETED | OUTPATIENT
Start: 2022-11-14 | End: 2022-11-14

## 2022-11-14 RX ORDER — LANOLIN ALCOHOL/MO/W.PET/CERES
3 CREAM (GRAM) TOPICAL
Status: DISCONTINUED | OUTPATIENT
Start: 2022-11-14 | End: 2022-11-15 | Stop reason: HOSPADM

## 2022-11-14 RX ORDER — CLONAZEPAM 1 MG/1
1 TABLET ORAL 3 TIMES DAILY PRN
COMMUNITY

## 2022-11-14 RX ADMIN — SODIUM CHLORIDE, POTASSIUM CHLORIDE, SODIUM LACTATE AND CALCIUM CHLORIDE: 600; 310; 30; 20 INJECTION, SOLUTION INTRAVENOUS at 21:17

## 2022-11-14 RX ADMIN — MORPHINE SULFATE 4 MG: 4 INJECTION, SOLUTION INTRAMUSCULAR; INTRAVENOUS at 03:53

## 2022-11-14 RX ADMIN — MORPHINE SULFATE 4 MG: 4 INJECTION, SOLUTION INTRAMUSCULAR; INTRAVENOUS at 02:07

## 2022-11-14 RX ADMIN — POTASSIUM CHLORIDE 10 MEQ: 7.46 INJECTION, SOLUTION INTRAVENOUS at 03:19

## 2022-11-14 RX ADMIN — POTASSIUM CHLORIDE 10 MEQ: 7.46 INJECTION, SOLUTION INTRAVENOUS at 13:15

## 2022-11-14 RX ADMIN — ACETAMINOPHEN 650 MG: 325 TABLET, FILM COATED ORAL at 14:36

## 2022-11-14 RX ADMIN — LORAZEPAM 1 MG: 1 TABLET ORAL at 22:28

## 2022-11-14 RX ADMIN — IOPAMIDOL 91 ML: 755 INJECTION, SOLUTION INTRAVENOUS at 01:48

## 2022-11-14 RX ADMIN — MAGNESIUM SULFATE HEPTAHYDRATE 2 G: 2 INJECTION, SOLUTION INTRAVENOUS at 05:49

## 2022-11-14 RX ADMIN — POTASSIUM CHLORIDE 10 MEQ: 7.46 INJECTION, SOLUTION INTRAVENOUS at 12:13

## 2022-11-14 RX ADMIN — ONDANSETRON 4 MG: 2 INJECTION INTRAMUSCULAR; INTRAVENOUS at 21:12

## 2022-11-14 RX ADMIN — POTASSIUM CHLORIDE 10 MEQ: 7.46 INJECTION, SOLUTION INTRAVENOUS at 06:42

## 2022-11-14 RX ADMIN — POTASSIUM CHLORIDE 10 MEQ: 7.46 INJECTION, SOLUTION INTRAVENOUS at 15:22

## 2022-11-14 RX ADMIN — POTASSIUM CHLORIDE 10 MEQ: 7.46 INJECTION, SOLUTION INTRAVENOUS at 05:49

## 2022-11-14 RX ADMIN — SODIUM CHLORIDE: 9 INJECTION, SOLUTION INTRAVENOUS at 03:19

## 2022-11-14 RX ADMIN — POTASSIUM CHLORIDE 10 MEQ: 7.46 INJECTION, SOLUTION INTRAVENOUS at 05:00

## 2022-11-14 RX ADMIN — SODIUM CHLORIDE 80 ML: 9 INJECTION, SOLUTION INTRAVENOUS at 01:48

## 2022-11-14 RX ADMIN — MORPHINE SULFATE 4 MG: 4 INJECTION, SOLUTION INTRAMUSCULAR; INTRAVENOUS at 06:13

## 2022-11-14 RX ADMIN — ONDANSETRON 4 MG: 2 INJECTION INTRAMUSCULAR; INTRAVENOUS at 01:56

## 2022-11-14 RX ADMIN — SODIUM CHLORIDE 1000 ML: 9 INJECTION, SOLUTION INTRAVENOUS at 01:20

## 2022-11-14 RX ADMIN — IRON SUCROSE 300 MG: 20 INJECTION, SOLUTION INTRAVENOUS at 16:54

## 2022-11-14 RX ADMIN — HYDROMORPHONE HYDROCHLORIDE 0.2 MG: 0.2 INJECTION, SOLUTION INTRAMUSCULAR; INTRAVENOUS; SUBCUTANEOUS at 13:17

## 2022-11-14 RX ADMIN — MORPHINE SULFATE 4 MG: 4 INJECTION, SOLUTION INTRAMUSCULAR; INTRAVENOUS at 21:13

## 2022-11-14 RX ADMIN — SODIUM CHLORIDE, POTASSIUM CHLORIDE, SODIUM LACTATE AND CALCIUM CHLORIDE: 600; 310; 30; 20 INJECTION, SOLUTION INTRAVENOUS at 13:51

## 2022-11-14 RX ADMIN — POTASSIUM CHLORIDE 10 MEQ: 7.46 INJECTION, SOLUTION INTRAVENOUS at 14:18

## 2022-11-14 ASSESSMENT — ACTIVITIES OF DAILY LIVING (ADL)
ADLS_ACUITY_SCORE: 35

## 2022-11-14 ASSESSMENT — ENCOUNTER SYMPTOMS
LIGHT-HEADEDNESS: 1
HEADACHES: 1
FEVER: 1
RHINORRHEA: 1
BACK PAIN: 1
DIZZINESS: 1
NAUSEA: 1
SHORTNESS OF BREATH: 1

## 2022-11-14 NOTE — ED TRIAGE NOTES
Pt comes in via EMS for increased vaginal bleeding last 4 days. Pt did have a procedure 3 weeks ago to remove fibroids. Pt. States she is not sure if it is her regular period or not. Cramps with the increase in bleeding.       chest discomfort

## 2022-11-14 NOTE — ED NOTES
"[facility]  ED Nurse Handoff Report    ED Chief complaint: Vaginal Bleeding      ED Diagnosis:   Final diagnoses:   Dizziness   Anemia, unspecified type   Pelvic pain in female   Hypokalemia   Hypomagnesemia   Episode of heavy vaginal bleeding   Prolonged Q-T interval on ECG       Code Status: Full Code    Allergies: No Known Allergies    Patient Story: Pt had vaginal bleeding that increased over the last 4 days. Had a fibroid procedure 3 weeks ago.   Focused Assessment:  A/Ox4, can make needs made. RA sats 99%. Abdominal pain 8/10 cramps with bleeding and clots that have been increasing the last 4 days. Morphine works well.    Treatments and/or interventions provided: IV, labs, CT, meds  Patient's response to treatments and/or interventions: Responded well    To be done/followed up on inpatient unit:  Monitor labs, GYN consult    Does this patient have any cognitive concerns?:  None    Activity level - Baseline/Home:  Independent  Activity Level - Current:   Stand with Assist    Patient's Preferred language: English   Needed?: No    Isolation: None  Infection: Not Applicable  Patient tested for COVID 19 prior to admission: YES  Bariatric?: No    Vital Signs:   Vitals:    11/14/22 0002 11/14/22 0316 11/14/22 0416 11/14/22 0603   BP: 135/87 122/71  133/83   Pulse: 111 91  90   Resp: 25      Temp: 98.4  F (36.9  C)      TempSrc: Oral      SpO2: 100% 100% 98% 99%   Weight: 81.6 kg (180 lb)      Height: 1.575 m (5' 2\")          Cardiac Rhythm:     Was the PSS-3 completed:   Yes  What interventions are required if any?               Family Comments: Mom and Dad active in care, pt has a son  OBS brochure/video discussed/provided to patient/family: N/A              Name of person given brochure if not patient: NA              Relationship to patient: NA    For the majority of the shift this patient's behavior was Green.   Behavioral interventions performed were NOne.    ED NURSE PHONE NUMBER: FAY " 1

## 2022-11-14 NOTE — H&P
"[facility]    History and Physical  Hospitalist     Date of Admission:  11/13/2022    Assessment & Plan   Audelia Hernandez is a 36 year old female with a past medical history of ulcerative colitis and fibroid s/p surgery on 10/19 presents to the hospital with vaginal bleeding, hypokalemia and hypomagnesemia.     Hypokalemia  Hypomagnesemia  Prolonged qt  Likely secondary to poor po intake over the last 1-2 weeks. She denies any vomiting or diarrhea. She denies any laxative or diuretic use. She denies any alcohol use. She denies any past history of electrolyte abnormalities.   -replace per protocols  -monitor on tele  -avoid qt prolonging medications.     Vaginal bleeding  Normocytic anemia  Post surgical vaginal bleeding for 1 week. Bleeding has stopped in the er. Ct scan with large mass in the uterus which gyn/onc thought is likely hemorrhage in the myomectomy bed. They recommended trending hb and will seen the patient later in the day.   -trend hb q8  -consent for transfusion obtained  -f/u gyn/onc consult    Ulcerative colitis  Reports that symptoms have been well controlled on sulfasalazine  -resume pta meds once med rec is complete     Code Status   Full Code  DVT ppx: scd  Expected length of stay less than 2 days    Clinically Significant Risk Factors Present on Admission        # Hypokalemia: Lowest K = 2.6 mmol/L (Ref range: 3.4-5.3) in last 2 days, will replace as needed     # Hypomagnesemia: Lowest Mg = 1.5 mg/dL (Ref range: 1.7-2.3) in last 2 days, will replace as needed   # Hypoalbuminemia: Lowest albumin = 3.3 g/dL (Ref range: 3.5-5.2) at 11/14/2022 12:26 AM, will monitor as appropriate         # Obesity: Estimated body mass index is 32.92 kg/m  as calculated from the following:    Height as of this encounter: 1.575 m (5' 2\").    Weight as of this encounter: 81.6 kg (180 lb).           Primary Care Physician   Yaneth Rosado    Chief Complaint   Vaginal " Bleeding    History obtained from the patient    History of Present Illness   Audelia Hernandez is a 36 year old female with a significant past medical history of fibroid surgery on 10/19/22 presents to the hospital with vaginal bleeding and generalized weakness. The patient reports that after the surgery she felt like she was improving until approximately one week ago when she noticed vaginal bleeding and cramping. She spoke with her provider who thought that it might be her menstrual period. She treated her pain with her post op oxycodone, tylenol and ibuprofen. She reports that her pain at its worse was a 9/10 located midline, below the belly button and radiating down to her groin. She reports that over the week her vaginal bleeding increased to the point that she was filling up 5-6 pads a day and passing blood cloths. She started experiencing dizziness. She also reports poor po intake during the last 1-2 weeks due to nausea from her abdominal pain. Due to ongoing symptoms the patient came in to the hospital. She denies taking any other medications. She denies any vomiting or diarrhea.     Past Medical History    I have reviewed this patient's medical history and updated it with pertinent information if needed.   Past Medical History:   Diagnosis Date     ADHD (attention deficit hyperactivity disorder)      Alleged drug diversion      Chronic insomnia      Chronic ulcerative proctitis with rectal bleeding (H)      Daily headache      MARÍA (generalized anxiety disorder)      Hematochezia      Increased frequency of urination      MDD (major depressive disorder)      Migraine      OCD (obsessive compulsive disorder)      Panic disorder without agoraphobia      Tension headache        Past Surgical History   I have reviewed this patient's surgical history and updated it with pertinent information if needed.  Past Surgical History:   Procedure Laterality Date     DAVINCI MYOMECTOMY N/A 10/19/2022    Procedure:  ROBOTIC ASSISTED MYOMECTOMY;  Surgeon: Chantel Mckoy MD;  Location:  OR     GYN SURGERY           HEAD & NECK SURGERY      wisdom teeth extraction       Allergies   No Known Allergies    Social History   I have reviewed this patient's social history and updated it with pertinent information if needed. Audelia Hernandez  reports that she has quit smoking. Her smoking use included cigarettes. She smoked an average of .5 packs per day. She has never used smokeless tobacco. She reports that she does not currently use alcohol. She reports that she does not currently use drugs.    Family History   I have reviewed this patient's family history and updated it with pertinent information if needed.   Family history reviewed, no pertinent history present.     Review of Systems   The 10 point Review of Systems is negative other than noted in the HPI or here.     Physical Exam   Temp: 98.4  F (36.9  C) Temp src: Oral BP: 122/71 Pulse: 91   Resp: 25 SpO2: 98 %      Vital Signs with Ranges  Temp:  [98.4  F (36.9  C)] 98.4  F (36.9  C)  Pulse:  [] 91  Resp:  [25] 25  BP: (122-135)/(71-87) 122/71  SpO2:  [98 %-100 %] 98 %  180 lbs 0 oz  Physical Exam  Vitals reviewed.   Constitutional:       Appearance: Normal appearance.      Comments: Pleasant well developed lady seen resting in bed comfortably in no apparent distress.    HENT:      Head: Normocephalic and atraumatic.      Mouth/Throat:      Mouth: Mucous membranes are moist.      Pharynx: Oropharynx is clear.   Eyes:      Extraocular Movements: Extraocular movements intact.      Conjunctiva/sclera: Conjunctivae normal.      Pupils: Pupils are equal, round, and reactive to light.   Cardiovascular:      Rate and Rhythm: Normal rate and regular rhythm.      Pulses: Normal pulses.      Heart sounds: Normal heart sounds. No murmur heard.  Pulmonary:      Effort: Pulmonary effort is normal.      Breath sounds: Normal breath sounds. No wheezing, rhonchi or  rales.   Abdominal:      General: Abdomen is flat. Bowel sounds are normal. There is no distension.      Palpations: Abdomen is soft. There is no mass.      Tenderness: There is abdominal tenderness. There is no guarding.      Comments: Tender to palpation in the suprapubic region.   Musculoskeletal:         General: No swelling. Normal range of motion.      Cervical back: Normal range of motion and neck supple.   Skin:     General: Skin is warm and dry.   Neurological:      General: No focal deficit present.      Mental Status: She is alert and oriented to person, place, and time. Mental status is at baseline.      Cranial Nerves: No cranial nerve deficit.

## 2022-11-14 NOTE — ED PROVIDER NOTES
"  History   Chief Complaint:  Vaginal Bleeding       HPI   Audelia Hernandez is a 36 year old female s/p fibroid surgery a month ago (10/19/22) who presents with vaginal bleeding and vaginal pain. She reports the bleeding began about a week ago, and in the last 3 days has become very heavy and accompanied by clots. This evening she had \"gushing\" vaginal bleeding that was dark red with clots and had intermittent shooting vaginal pain and cramping. Following surgery, she has experienced headaches, lightheadedness, dizziness, nausea, fever, and shortness of breath. She received a chest CT two weeks ago (10/27) that was negative for blood clots. She also had back pain for the last three days. She has received 2 blood transfusions in the past prior to surgery. She sees Dr Rsoado at [facility]. Her recent fibroid surgery was performed by Dr. Mckoy at [facility] for laparoscopic surgery. She tested negative for COVID 1.5 weeks ago. She denies history of blood clot. She is a former smoker and does not use hormonal medications.     Review of Systems   Constitutional: Positive for fever.   HENT: Positive for rhinorrhea.    Respiratory: Positive for shortness of breath.    Gastrointestinal: Positive for nausea.   Genitourinary: Positive for pelvic pain, vaginal bleeding and vaginal pain.   Musculoskeletal: Positive for back pain.   Neurological: Positive for dizziness, light-headedness and headaches.   All other systems reviewed and are negative.      Allergies:  The patient has no known allergies.     Medications:  Celebrex  Pristiq   Ferosul  Folvite  Ritalin  Zofran  Senna  Zoloft  Azulfidine  Lysteda  Desyrel  Ambien    Past Medical History:     Fibroid  Menometrorrhagia  Iron deficiency anemia  Anxiety and depression  Ulcerative colitis  MARÍA  Panic disorder  OCD  ADHD  MDD  Alleged drug diversion  Migraine  Chronic insomnia  Preeclampsia  Otitis externa  Acute blood loss anemia  Opioid type dependence    Past " "Surgical History:    [place] teeth extraction   section  Myomectomy    Family History:    Father - diabetes type II  Brother - ADHD    Social History:  The patient presents to the ED with her father.  Tobacco Use: Nonsmoker  PCP: Yaneth Rosado       Physical Exam     Patient Vitals for the past 24 hrs:   BP Temp Temp src Pulse Resp SpO2 Height Weight   22 0416 -- -- -- -- -- 98 % -- --   22 0316 122/71 -- -- 91 -- 100 % -- --   22 0002 135/87 98.4  F (36.9  C) Oral 111 25 100 % 1.575 m (5' 2\") 81.6 kg (180 lb)       Physical Exam  General: Well-nourished, appears fatigued  Eyes: PERRL, conjunctivae pink no scleral icterus or conjunctival injection  ENT:  Moist mucus membranes, posterior oropharynx clear without erythema or exudates  Respiratory:  Lungs clear to auscultation bilaterally, no crackles/rubs/wheezes.  Good air movement  CV: Mildly tachycardic rate and regular rhythm, no murmurs/rubs/gallops  GI:  Abdomen soft and non-distended.  Normoactive BS.  Mild pelvic tenderness, no guarding or rebound  : Normal external exam.  Os is closed.  Minimal blood in the vaginal vault.  Skin: Warm, dry.  No rashes or petechiae  Musculoskeletal: No peripheral edema or calf tenderness  Neuro: Alert and oriented to person/place/time  Psychiatric: Anxious affect    Emergency Department Course   ECG  ECG results from 22   EKG 12 lead     Value    Systolic Blood Pressure     Diastolic Blood Pressure     Ventricular Rate 85    Atrial Rate 85    MA Interval 150    QRS Duration 96        QTc 483    P Axis 66    R AXIS 23    T Axis 34    Interpretation ECG      Sinus rhythm  Prolonged QT  Abnormal ECG  No previous ECGs available  Confirmed by GENERATED REPORT, COMPUTER ([id number]),  Lupe Lester ([id number]) on 2022 3:03:35 AM         Imaging:  CT Chest (PE) Abdomen Pelvis w Contrast   Final Result   IMPRESSION:    1.  Large 9 cm heterogeneous enhancing mass in the left central " aspect of the uterus. This is nonspecific, but could represent a fibroid. Neoplasm cannot be excluded. The mass has not convincingly changed in size since the recent comparison study dated    10/27/2022.   2.  No acute abnormality identified in the chest, abdomen or pelvis.   3.  No visualized pulmonary embolus.        Report per radiology    Laboratory:  Labs Ordered and Resulted from Time of ED Arrival to Time of ED Departure   COMPREHENSIVE METABOLIC PANEL - Abnormal       Result Value    Sodium 140      Potassium 2.6 (*)     Chloride 108      Carbon Dioxide (CO2) 23      Anion Gap 9      Urea Nitrogen 5 (*)     Creatinine 0.50 (*)     Calcium 8.6      Glucose 119 (*)     Alkaline Phosphatase 73      AST 20      ALT 47      Protein Total 6.5 (*)     Albumin 3.3 (*)     Bilirubin Total 0.7      GFR Estimate >90     CBC WITH PLATELETS AND DIFFERENTIAL - Abnormal    WBC Count 8.1      RBC Count 3.12 (*)     Hemoglobin 8.8 (*)     Hematocrit 26.4 (*)     MCV 85      MCH 28.2      MCHC 33.3      RDW 14.2      Platelet Count 386      % Neutrophils 60      % Lymphocytes 31      % Monocytes 7      % Eosinophils 2      % Basophils 0      % Immature Granulocytes 0      NRBCs per 100 WBC 0      Absolute Neutrophils 4.7      Absolute Lymphocytes 2.5      Absolute Monocytes 0.5      Absolute Eosinophils 0.2      Absolute Basophils 0.0      Absolute Immature Granulocytes 0.0      Absolute NRBCs 0.0     MAGNESIUM - Abnormal    Magnesium 1.5 (*)    HCG QUALITATIVE PREGNANCY - Normal    hCG Serum Qualitative Negative     INFLUENZA A/B & SARS-COV2 PCR MULTIPLEX   TYPE AND SCREEN, ADULT    ABO/RH(D) O POS      Antibody Screen Negative      SPECIMEN EXPIRATION DATE [id number]     ABO/RH TYPE AND SCREEN        Emergency Department Course:       Reviewed:  I reviewed nursing notes, vitals, past medical history and Care Everywhere    Assessments:  0046 I obtained history and examined the patient as noted above.   0356 I rechecked  the patient, performed pelvic exam and explained findings.     Consults:  0510 I spoke to Dr. Dubon, gynecologic oncologist, regarding the patient.  0515 I spoke to Dr. White, hospitalist, who accepts the patient.    Interventions:  0120 NS bolus 1L IV  0156 Zofran 4 mg IV  0207 Morphine 4 mg IV  0319 Potassium chloride 10 mEq in 100 mL sterile water infusion IV  0319 NS infusion IV  0353 Morphine 4 mg IV    Disposition:  The patient was admitted to the hospital under the care of Dr. White.     Impression & Plan     Medical Decision Making:  Audelia Hernandez is a 36 year old female with a history of recent fibroid myomectomy by Dr. Mckoy who comes with dizziness, shortness of breath, headache and heavy vaginal bleeding.  She is anemic and this appears to be a drop of approximately 2 g from what she reports as her recent hemoglobin check.  Fortunately, on pelvic examination the bleeding seems to have slowed significantly.  She was typed and screened in case she does require blood transfusion as she has in the past.  She was mildly tachycardic on arrival and complained of significant shortness of breath and lightheadedness.  Given the recent history I felt she was high risk for pulmonary embolism.  CT scan of the chest abdomen and pelvis was thus obtained to rule out pulmonary embolism or another complication of the surgery such as intra abdominal abscess or hemorrhage.  Fortunately no signs of pulmonary embolism or lung pathology.  The CT scan shows a large mass in the uterus.  I discussed with Dr. Rosario on call for Dr. Mckoy and she reported this is likely hemorrhage in the myomectomy bed in the setting of her period.  She recommend serial hemoglobins and they will consult on the patient to further evaluate her and provide additional treatment recommendations.  Unexpectedly, the patient's potassium is quite low.  Her magnesium is low as well.  She has a mildly prolonged QTC as expected on her  EKG.  The etiology of these electrolyte abnormalities is not clear as she has not had any vomiting or diarrhea.  We treated her with IV fluids and started replacement of both the potassium and magnesium.  She will be admitted to the observation unit for further evaluation, to complete the repletion of her electrolyte abnormalities, for serial hemoglobins and [facility]  Dr. Regalado graciously agreed to meet the patient.  The patient was in agreement with the plan as well.    Diagnosis:    ICD-10-CM    1. Dizziness  R42       2. Anemia, unspecified type  D64.9       3. Pelvic pain in female  R10.2       4. Hypokalemia  E87.6       5. Hypomagnesemia  E83.42       6. Episode of heavy vaginal bleeding  N93.9       7. Prolonged Q-T interval on ECG  R94.31           Scribe Disclosure:  IMaricruz, am serving as a scribe at 12:46 AM on 11/14/2022 to document services personally performed by Eileen Jurado MD based on my observations and the provider's statements to me.     IDebra, am serving as a scribe () on 11/14/2022 at 1:38 AM to personally document services performed by Eileen Jurado MD based on my observations and the provider's statements to me.             Eileen Jurado MD  11/14/22 0525

## 2022-11-14 NOTE — PROGRESS NOTES
Hospitalist brief update note:    Evaluated/admitted by my colleague this am. Noted Hb stable. Reports vaginal bleeding has slowed down, but frustrated about not having answer and is awaiting Gyn consult. Discussed with RN and CECY, paging Gyn.  - lytes replaced, recheck EKG in am  - IV iron ordered.    Plan as outlined on admission H&P.    Anthony Clemens MD  Hospitalist

## 2022-11-14 NOTE — ED NOTES
Call light answered.  Patient states that she has been having a lot of phlegm in her throat and she's unable to spit due to vaginal bleeding.  She was given how to use a suction.

## 2022-11-14 NOTE — PHARMACY-ADMISSION MEDICATION HISTORY
Pharmacy Medication History  Admission medication history interview status for the 11/13/2022  admission is complete. See EPIC admission navigator for prior to admission medications     Location of Interview: Patient room  Medication history sources: Patient and Surescripts    Significant changes made to the medication list:  Removed: celebrex, oxycodone, senna, tranexamic acid  Added: clonazepam, cephalexin, Nasonex  Modified: ferrous sulfate, methylphenidate, sulfasalazine and trazodone    In the past week, patient estimated taking medication this percent of the time: greater than 90%    Additional medication history information:   None    Medication reconciliation completed by provider prior to medication history? No    Time spent in this activity: 20 min    Prior to Admission medications    Medication Sig Last Dose Taking? Auth Provider Long Term End Date   acetaminophen (TYLENOL) 500 MG tablet Take 1,000 mg by mouth every 6 hours as needed (3 x 500 mg = 1,500 mg)  at prn Yes Reported, Patient     cephALEXin (KEFLEX) 500 MG capsule Take 500 mg by mouth 4 times daily X7 days, started 11/10.  Pt developed a body rash 1 day after taking and stopped. 11/11/2022 Yes Unknown, Entered By History     clonazePAM (KLONOPIN) 1 MG tablet Take 1 mg by mouth 3 times daily as needed for anxiety  Yes Unknown, Entered By History No    desvenlafaxine (PRISTIQ) 50 MG 24 hr tablet Take 50 mg by mouth every evening 11/12/2022 at pm Yes Reported, Patient Yes    ferrous sulfate (FEROSUL) 325 (65 Fe) MG tablet Take 325 mg by mouth 2 times daily 11/12/2022 at pm Yes Reported, Patient     folic acid (FOLVITE) 1 MG tablet Take 1 mg by mouth every evening 11/12/2022 Yes Reported, Patient     ibuprofen (ADVIL/MOTRIN) 800 MG tablet Take 800 mg by mouth every 6 hours as needed for moderate pain (4-6)  at prn Yes Unknown, Entered By History     methylphenidate (RITALIN) 20 MG tablet Take 30 mg by mouth 3 times daily as needed (1.5 x 20 mg = 30  mg)  at prn Yes Reported, Patient     mometasone (NASONEX) 50 MCG/ACT nasal spray Spray 2 sprays into both nostrils every evening  at pm Yes Unknown, Entered By History     Multiple Vitamin (MULTIVITAMIN PO) Take 1 capsule by mouth every evening 11/12/2022 Yes Reported, Patient     ondansetron (ZOFRAN ODT) 4 MG ODT tab Take 4 mg by mouth every 8 hours as needed for nausea  at prn Yes Reported, Patient     oxyCODONE-acetaminophen (PERCOCET) 5-325 MG tablet Take 1 tablet by mouth every 6 hours as needed for severe pain  at prn Yes Reported, Patient     sertraline (ZOLOFT) 100 MG tablet Take 200 mg by mouth every evening (2 x 100 mg = 200 mg) 11/12/2022 at pm Yes Reported, Patient Yes    sulfaSALAzine (AZULFIDINE) 500 MG tablet Take 500 mg by mouth every evening 11/12/2022 at pm Yes Reported, Patient     traZODone (DESYREL) 100 MG tablet Take 50 mg by mouth nightly as needed  at prn Yes Reported, Patient Yes    zolpidem (AMBIEN) 10 MG tablet Take 10 mg by mouth nightly as needed for sleep  at prn Yes Reported, Patient         The information provided in this note is only as accurate as the sources available at the time of update(s)

## 2022-11-14 NOTE — PLAN OF CARE
Date & Time: 11/14 9064-9494  Diagnosis: Prolonged QT  Procedures: PTA 10/19 - fibroid removal surgery  Orientation/Cognitive: AOx4  VS/O2: VSS, RA  Mobility: Independent  Diet: NPO for surgery possibility  Pain Management: PRN dilaudid or morphine or tylenol  Bowel & Bladder: Continent - minimal vag bleeding  Skin: PTA abd incisions - CDI  Abnormal Labs: K 3.4 (replacing), BUN 5, Cr 0.5, Mg 2.3 (recheck in AM), , Hgb 8.3  Tele: NSR  IV Access/Drips/Fluids: L PIV LR @ 100ml/hr  Drains: NA  Tests: Chest CT - lesion on uterus  Consults: Hospitalist, gyn/onc  Discharge Plan: Pending

## 2022-11-14 NOTE — ED NOTES
Bed: ED02  Expected date:   Expected time:   Means of arrival:   Comments:  HEMS 433/ 36F/ Vag bleeding ETA 1148 PM

## 2022-11-15 VITALS
HEIGHT: 62 IN | HEART RATE: 82 BPM | DIASTOLIC BLOOD PRESSURE: 72 MMHG | OXYGEN SATURATION: 97 % | BODY MASS INDEX: 33.13 KG/M2 | WEIGHT: 180 LBS | RESPIRATION RATE: 18 BRPM | TEMPERATURE: 98.7 F | SYSTOLIC BLOOD PRESSURE: 116 MMHG

## 2022-11-15 LAB
ANION GAP SERPL CALCULATED.3IONS-SCNC: 4 MMOL/L (ref 3–14)
BUN SERPL-MCNC: 3 MG/DL (ref 7–30)
CALCIUM SERPL-MCNC: 8.7 MG/DL (ref 8.5–10.1)
CHLORIDE BLD-SCNC: 109 MMOL/L (ref 94–109)
CO2 SERPL-SCNC: 28 MMOL/L (ref 20–32)
CREAT SERPL-MCNC: 0.48 MG/DL (ref 0.52–1.04)
GFR SERPL CREATININE-BSD FRML MDRD: >90 ML/MIN/1.73M2
GLUCOSE BLD-MCNC: 110 MG/DL (ref 70–99)
HGB BLD-MCNC: 8.7 G/DL (ref 11.7–15.7)
HGB BLD-MCNC: 8.8 G/DL (ref 11.7–15.7)
MAGNESIUM SERPL-MCNC: 2.1 MG/DL (ref 1.6–2.3)
POTASSIUM BLD-SCNC: 3.7 MMOL/L (ref 3.4–5.3)
SODIUM SERPL-SCNC: 141 MMOL/L (ref 133–144)

## 2022-11-15 PROCEDURE — 250N000013 HC RX MED GY IP 250 OP 250 PS 637: Performed by: HOSPITALIST

## 2022-11-15 PROCEDURE — 85018 HEMOGLOBIN: CPT | Performed by: HOSPITALIST

## 2022-11-15 PROCEDURE — 83735 ASSAY OF MAGNESIUM: CPT | Performed by: HOSPITALIST

## 2022-11-15 PROCEDURE — 250N000011 HC RX IP 250 OP 636: Performed by: EMERGENCY MEDICINE

## 2022-11-15 PROCEDURE — 80048 BASIC METABOLIC PNL TOTAL CA: CPT | Performed by: HOSPITALIST

## 2022-11-15 PROCEDURE — 36415 COLL VENOUS BLD VENIPUNCTURE: CPT | Performed by: HOSPITALIST

## 2022-11-15 PROCEDURE — G0378 HOSPITAL OBSERVATION PER HR: HCPCS

## 2022-11-15 PROCEDURE — 96361 HYDRATE IV INFUSION ADD-ON: CPT

## 2022-11-15 PROCEDURE — 96376 TX/PRO/DX INJ SAME DRUG ADON: CPT

## 2022-11-15 PROCEDURE — 99217 PR OBSERVATION CARE DISCHARGE: CPT | Performed by: HOSPITALIST

## 2022-11-15 RX ORDER — ZOLPIDEM TARTRATE 5 MG/1
10 TABLET ORAL
Status: DISCONTINUED | OUTPATIENT
Start: 2022-11-15 | End: 2022-11-15 | Stop reason: HOSPADM

## 2022-11-15 RX ORDER — BENZOCAINE/MENTHOL 6 MG-10 MG
LOZENGE MUCOUS MEMBRANE 2 TIMES DAILY
Status: DISCONTINUED | OUTPATIENT
Start: 2022-11-15 | End: 2022-11-15 | Stop reason: HOSPADM

## 2022-11-15 RX ORDER — CLONAZEPAM 0.5 MG/1
1 TABLET ORAL 3 TIMES DAILY PRN
Status: DISCONTINUED | OUTPATIENT
Start: 2022-11-15 | End: 2022-11-15 | Stop reason: HOSPADM

## 2022-11-15 RX ORDER — MUPIROCIN 20 MG/G
OINTMENT TOPICAL 2 TIMES DAILY
Status: DISCONTINUED | OUTPATIENT
Start: 2022-11-15 | End: 2022-11-15 | Stop reason: HOSPADM

## 2022-11-15 RX ORDER — DESLORATADINE 5 MG/1
5 TABLET ORAL DAILY
Qty: 7 TABLET | Refills: 0 | Status: SHIPPED | OUTPATIENT
Start: 2022-11-15

## 2022-11-15 RX ORDER — NALOXONE HYDROCHLORIDE 0.4 MG/ML
0.4 INJECTION, SOLUTION INTRAMUSCULAR; INTRAVENOUS; SUBCUTANEOUS
Status: DISCONTINUED | OUTPATIENT
Start: 2022-11-15 | End: 2022-11-15 | Stop reason: HOSPADM

## 2022-11-15 RX ORDER — SERTRALINE HYDROCHLORIDE 100 MG/1
200 TABLET, FILM COATED ORAL EVERY EVENING
Status: DISCONTINUED | OUTPATIENT
Start: 2022-11-15 | End: 2022-11-15 | Stop reason: HOSPADM

## 2022-11-15 RX ORDER — OXYCODONE AND ACETAMINOPHEN 5; 325 MG/1; MG/1
1 TABLET ORAL EVERY 6 HOURS PRN
Status: DISCONTINUED | OUTPATIENT
Start: 2022-11-15 | End: 2022-11-15 | Stop reason: HOSPADM

## 2022-11-15 RX ORDER — FLUTICASONE PROPIONATE 50 MCG
2 SPRAY, SUSPENSION (ML) NASAL DAILY
Status: DISCONTINUED | OUTPATIENT
Start: 2022-11-15 | End: 2022-11-15 | Stop reason: HOSPADM

## 2022-11-15 RX ORDER — DIPHENHYDRAMINE HCL 25 MG
25-50 CAPSULE ORAL EVERY 4 HOURS PRN
Status: DISCONTINUED | OUTPATIENT
Start: 2022-11-15 | End: 2022-11-15 | Stop reason: HOSPADM

## 2022-11-15 RX ORDER — SULFASALAZINE 500 MG/1
500 TABLET ORAL EVERY EVENING
Status: DISCONTINUED | OUTPATIENT
Start: 2022-11-15 | End: 2022-11-15 | Stop reason: HOSPADM

## 2022-11-15 RX ORDER — PROMETHAZINE HYDROCHLORIDE 25 MG/1
25 SUPPOSITORY RECTAL EVERY 6 HOURS PRN
Qty: 10 SUPPOSITORY | Refills: 0 | Status: SHIPPED | OUTPATIENT
Start: 2022-11-15

## 2022-11-15 RX ORDER — ACETAMINOPHEN 500 MG
1000 TABLET ORAL EVERY 6 HOURS PRN
COMMUNITY
Start: 2022-11-15

## 2022-11-15 RX ORDER — FAMOTIDINE 20 MG/1
20 TABLET, FILM COATED ORAL 2 TIMES DAILY
Qty: 14 TABLET | Refills: 0 | Status: SHIPPED | OUTPATIENT
Start: 2022-11-15

## 2022-11-15 RX ORDER — TRAZODONE HYDROCHLORIDE 50 MG/1
50 TABLET, FILM COATED ORAL
Status: DISCONTINUED | OUTPATIENT
Start: 2022-11-15 | End: 2022-11-15 | Stop reason: HOSPADM

## 2022-11-15 RX ORDER — NALOXONE HYDROCHLORIDE 0.4 MG/ML
0.2 INJECTION, SOLUTION INTRAMUSCULAR; INTRAVENOUS; SUBCUTANEOUS
Status: DISCONTINUED | OUTPATIENT
Start: 2022-11-15 | End: 2022-11-15 | Stop reason: HOSPADM

## 2022-11-15 RX ORDER — DESVENLAFAXINE 50 MG/1
50 TABLET, FILM COATED, EXTENDED RELEASE ORAL EVERY EVENING
Status: DISCONTINUED | OUTPATIENT
Start: 2022-11-15 | End: 2022-11-15 | Stop reason: HOSPADM

## 2022-11-15 RX ORDER — ZOLPIDEM TARTRATE 5 MG/1
5 TABLET ORAL ONCE
Status: COMPLETED | OUTPATIENT
Start: 2022-11-15 | End: 2022-11-15

## 2022-11-15 RX ADMIN — ACETAMINOPHEN 650 MG: 325 TABLET, FILM COATED ORAL at 01:16

## 2022-11-15 RX ADMIN — OXYCODONE HYDROCHLORIDE AND ACETAMINOPHEN 1 TABLET: 5; 325 TABLET ORAL at 14:03

## 2022-11-15 RX ADMIN — FLUTICASONE PROPIONATE 2 SPRAY: 50 SPRAY, METERED NASAL at 14:05

## 2022-11-15 RX ADMIN — DIPHENHYDRAMINE HYDROCHLORIDE 50 MG: 25 CAPSULE ORAL at 02:57

## 2022-11-15 RX ADMIN — CLONAZEPAM 1 MG: 0.5 TABLET ORAL at 14:03

## 2022-11-15 RX ADMIN — ZOLPIDEM TARTRATE 5 MG: 5 TABLET ORAL at 02:57

## 2022-11-15 RX ADMIN — MORPHINE SULFATE 4 MG: 4 INJECTION, SOLUTION INTRAMUSCULAR; INTRAVENOUS at 01:14

## 2022-11-15 RX ADMIN — Medication 3 MG: at 01:16

## 2022-11-15 RX ADMIN — ACETAMINOPHEN 650 MG: 325 TABLET, FILM COATED ORAL at 09:31

## 2022-11-15 ASSESSMENT — ACTIVITIES OF DAILY LIVING (ADL)
ADLS_ACUITY_SCORE: 35

## 2022-11-15 NOTE — CONSULTS
[facility]    Oncology Consultation     Date of Admission:  11/13/2022    Assessment & Plan   Audelia Hernandez is a 36 year old female who was admitted on 11/13/2022 with complaint of vaginal bleeding and pelvic pain.   She is s/p robotic-assisted laparoscopic myometcomy and minilaparotomy on 10/19/22 for fibroid.      Uterine fibroid:    Fortunately, operative findings were negative for cancer.  She has a longstanding history of fibroids, and this surgery was performed in the hope of alleviating her heavy menses while maintaining fertility.  The bleeding she is having is due to her regular menstrual flow.  She may want to consider hormonal manipulation to help with menorraghia, but we will defer to her regular GYN for guidance on this. She states she did not try oral contraceptives and would be open to trying this for menorrhagia.      Pelvic pain:    CT scan from yesterday is unchanged from previous done on 10/27/22, and is felt to represent a post operative hematoma, per Dr. Mckoy.  This needs no intervention, and will subside over time. WBC remains WNL's. We will plan to re-image the area in 3 months.  Pain management may be managed by hospitalist.     She may be discharged per hospitalist recommendation.       We will see her in our office for an 8 week post op visit with Dr. Mckoy.  She will be contacted by our office to schedule this.     Active Problems:    * No active hospital problems. *      Briseida Coley, APRN CNP, APRN CNP  [facility], [place]  [phone]      Code Status    Full Code    Reason for Consult   Reason for consult: I was asked by Dr. Clemens to evaluate this patient for post surgical vaginal bleeding and pain.    Primary Care Physician   Yaneth Rosado    Chief Complaint   Vaginal bleeding and pain    She was doing reasonably well post-operatively, but started on her monthly menses on Friday night, and felt that it was heavier and more painful than they  had ever been, and she felt shaky and light-headed, so went to ER for evaluation.      History of Present Illness   Audelia Hernandez is a 36 year old female who presents with uterine fibroids    2-4 month h/o progressive menorrhagia, clot formation and daily bleeding and some pelvic discomfort    9/12/22 PUS: Uterus measures 11.7 x 9.5 x 9.5 cm.  Contains an 8.2 cm intramural fibroid.  1.3 cm endometrium.  Normal bilateral ovaries    9/22/22 Pelvic MRI: Uterus measures 12.1 x 8.9 x 10.2 cm. with 8.7 x 8.7 x 8.1 cm heterogeneous mass likely arising from anterior and slighly left uterine myometrium.  Normal bilateral ovaries.  Thickened peripheral enhancement of myometrium.  T1 hyperintense signal suggestive of hemorrhagic byproducts, possible red degeneration of large fibroid but uterine leiomyosarcoma cannot be entirely excluded.  No pelvic lymphadenopathy.    9/20/22 Consult with Dr. Thania Cortes.  Has known fibroids and menorrhagia.   Previously she had tried Provera but Provera made bleeding worse.  Had been put on Lysteda which helped more.  Woke with excruciating pain and heavy bleeding.  Discussed surgical and non-surgical options.  Patient interested in future fertility.  EMB performed    Pathology: Blood clot with scant benign endometrial tissue with evidence of gland and stromal breakdown.    10/13/22: ED visit for light headedness.  Hgb 7.2. Was given 1 unite PRBC's. Started on Lysteda.     10/19/22: Robotic assisted laparoscopic myomectomy mini laparotomy.  Final pathology, benign leiomyoma with coagulative/ischemic type necrosis and myxoid degeneration.    10/19/22: Seen in ED just after hospital discharge regarding dysuria.  Started on macrobid.     10/26/22: Hgb: 8.9 U/mL, normal WBC    10/27/22: CT C/A/P: No PE.  Splenomegaly at 15.1 cm.  Heterogeneous lesion in the uterus, possibly a fibroid but incompletely characterized.  Consider ultrasound if not already performed for further evaluation.       11/3/22: Hgb 10.2,  WBC 7.9     Past Medical History   I have reviewed this patient's medical history and updated it with pertinent information if needed.   Past Medical History:   Diagnosis Date     ADHD (attention deficit hyperactivity disorder)      Alleged drug diversion      Chronic insomnia      Chronic ulcerative proctitis with rectal bleeding (H)      Daily headache      MARÍA (generalized anxiety disorder)      Hematochezia      Increased frequency of urination      MDD (major depressive disorder)      Migraine      OCD (obsessive compulsive disorder)      Panic disorder without agoraphobia      Tension headache        Past Surgical History   I have reviewed this patient's surgical history and updated it with pertinent information if needed.  Past Surgical History:   Procedure Laterality Date     DAVINCI MYOMECTOMY N/A 10/19/2022    Procedure: ROBOTIC ASSISTED MYOMECTOMY;  Surgeon: Chantel Mckoy MD;  Location: [facility] OR     GYN SURGERY           HEAD & NECK SURGERY      wisdom teeth extraction       Prior to Admission Medications   Prior to Admission Medications   Prescriptions Last Dose Informant Patient Reported? Taking?   Multiple Vitamin (MULTIVITAMIN PO) 2022 Self Yes Yes   Sig: Take 1 capsule by mouth every evening   acetaminophen (TYLENOL) 500 MG tablet  at prn Self Yes Yes   Sig: Take 1,000 mg by mouth every 6 hours as needed (3 x 500 mg = 1,500 mg)   cephALEXin (KEFLEX) 500 MG capsule 2022  Yes Yes   Sig: Take 500 mg by mouth 4 times daily X7 days, started 11/10.  Pt developed a body rash 1 day after taking and stopped.   clonazePAM (KLONOPIN) 1 MG tablet   Yes Yes   Sig: Take 1 mg by mouth 3 times daily as needed for anxiety   desvenlafaxine (PRISTIQ) 50 MG 24 hr tablet 2022 at pm Self Yes Yes   Sig: Take 50 mg by mouth every evening   ferrous sulfate (FEROSUL) 325 (65 Fe) MG tablet 2022 at pm Self Yes Yes   Sig: Take 325 mg by mouth 2 times daily   folic  acid (FOLVITE) 1 MG tablet 11/12/2022 Self Yes Yes   Sig: Take 1 mg by mouth every evening   ibuprofen (ADVIL/MOTRIN) 800 MG tablet  at prn  Yes Yes   Sig: Take 800 mg by mouth every 6 hours as needed for moderate pain (4-6)   methylphenidate (RITALIN) 20 MG tablet  at prn Self Yes Yes   Sig: Take 30 mg by mouth 3 times daily as needed (1.5 x 20 mg = 30 mg)   mometasone (NASONEX) 50 MCG/ACT nasal spray  at pm  Yes Yes   Sig: Spray 2 sprays into both nostrils every evening   ondansetron (ZOFRAN ODT) 4 MG ODT tab  at prn Self Yes Yes   Sig: Take 4 mg by mouth every 8 hours as needed for nausea   oxyCODONE-acetaminophen (PERCOCET) 5-325 MG tablet  at prn Self Yes Yes   Sig: Take 1 tablet by mouth every 6 hours as needed for severe pain   sertraline (ZOLOFT) 100 MG tablet 11/12/2022 at pm Self Yes Yes   Sig: Take 200 mg by mouth every evening (2 x 100 mg = 200 mg)   sulfaSALAzine (AZULFIDINE) 500 MG tablet 11/12/2022 at pm Self Yes Yes   Sig: Take 500 mg by mouth every evening   traZODone (DESYREL) 100 MG tablet  at prn Self Yes Yes   Sig: Take 50 mg by mouth nightly as needed   zolpidem (AMBIEN) 10 MG tablet  at prn Self Yes Yes   Sig: Take 10 mg by mouth nightly as needed for sleep      Facility-Administered Medications: None     Allergies   No Known Allergies    Social History   I have reviewed this patient's social history and updated it with pertinent information if needed. Audelia MITCHELL Eviin  reports that she has quit smoking. Her smoking use included cigarettes. She smoked an average of .5 packs per day. She has never used smokeless tobacco. She reports that she does not currently use alcohol. She reports that she does not currently use drugs.    Family History   I have reviewed this patient's family history and updated it with pertinent information if needed.   No family history on file.        Physical Exam       BP: 109/68 Pulse: 78   Resp: 16 SpO2: 100 %      Vital Signs with Ranges  Pulse:  []  78  Resp:  [16-25] 16  BP: (109-152)/(68-94) 109/68  SpO2:  [95 %-100 %] 100 %  180 lbs 0 oz    GENERAL: awake, alert, NAD  ABD:  Mildly diffusely tender.

## 2022-11-15 NOTE — PLAN OF CARE
Patient is AO X 4; VSS; on RA; pain is controlled with PO analgesics.   She is tired and fatigued today. Says the vaginal bleeding has not stopped completely, it is just lighter.     -diagnostic tests and consults completed and resulted- met   -vital signs normal or at patient baseline - met  -safe disposition plan has been identified - not met

## 2022-11-15 NOTE — DISCHARGE SUMMARY
[facility]  Hospitalist Discharge Summary      Date of Admission:  11/13/2022  Date of Discharge:  11/15/2022  Discharging Provider: Anthony Clemens MD  Discharge Service: Hospitalist Service    Discharge Diagnoses     Please see the hospital course below    Follow-ups Needed After Discharge   Follow-up Appointments     Follow-up and recommended labs and tests       Follow up with primary care provider, Yaneth Rosado, within 7 days   to evaluate medication change and for hospital follow- up.               Unresulted Labs Ordered in the Past 30 Days of this Admission     No orders found from 10/14/2022 to 11/14/2022.      These results will be followed up by  none    Discharge Disposition   Discharged to home  Condition at discharge: Stable     Hospital Course     Audelia Hernandez is a 36 year old female with a past medical history of ulcerative colitis and fibroid s/p surgery on 10/19 presents to the hospital with vaginal bleeding, hypokalemia and hypomagnesemia.      Hypokalemia  Hypomagnesemia  Prolonged QTc  Likely secondary to poor po intake over the last 1-2 weeks. She denies any vomiting or diarrhea. She denies any laxative or diuretic use. She denies any alcohol use. She denies any past history of electrolyte abnormalities. is using NSAIDs for pain. No hematochezia or melena. Corrected QTc was 483.  -replaced per protocols  -monitored on tele- without arrhythmia  -lytes now normal. Appetite low but tolerating diet.   -a week of famotidine prescribed at discharge.      Vaginal bleeding  Normocytic anemia  Patient with s/p robotic-assisted laparoscopic myometcomy and minilaparotomy on 10/19/22 for fibroid. Post surgical vaginal bleeding for 1 week. CT abdomen with large mass in the uterus which gyn/onc thought is likely hemorrhage in the myomectomy bed.    -trended hb q8- stable at 8, did not require transfusion. Vaginal bleeding decreased and now spotting only   -f/u gyn/onc  consulted. CT abd findings stable compared to post op CT from 10/27. Recommended follow up with Gyn (appt scheduled)  -PTA iron supplement continued.     Ulcerative colitis  Reports that symptoms have been well controlled on sulfasalazine    Skin rash  Patient reported rash after starting keflex recently. Not itching now. Patient says she picks on them. Don't appear to be drug rash. Not infected. Continue anti histaminic for a week.     Consultations This Hospital Stay   GYNECOLOGIC ONCOLOGY IP CONSULT    Code Status   Full Code    Time Spent on this Encounter   I, Anthony Clemens MD, personally saw the patient today and spent greater than 30 minutes discharging this patient.       Anthony Clemens MD  [facility]  [address] [facility] [address] [address]  Phone: [phone]  ______________________________________________________________________    Physical Exam   Vital Signs: Temp: 98.3  F (36.8  C) Temp src: Oral BP: 111/52 Pulse: 77   Resp: 18 SpO2: 98 % O2 Device: None (Room air)    Weight: 180 lbs 0 oz    General: AAOx3, appears comfortable.  HEENT: PERRLA EOMI. Mucosa moist.   Lungs: Bilateral equal air entry. Clear to auscultation, normal work of breathing.   CVS: S1S2 regular, no tachycardia or murmur.   Abdomen: Soft, NT, ND. BS heard.  MSK: No edema or deformities.  Neuro: AAOX3. CN 2-12 normal. Strength symmetrical.  Skin:  Scabbing over the face and chest. No blister/pustules/open wound or drainage.          Primary Care Physician   Yaneth Rosado    Discharge Orders      Reason for your hospital stay    Vaginal bleeding  Nausea/hypokalemia     Follow-up and recommended labs and tests     Follow up with primary care provider, Yaneth Rosado, within 7 days to evaluate medication change and for hospital follow- up.     Activity    Your activity upon discharge: activity as tolerated     Diet    Follow this diet upon discharge: Orders Placed  This Encounter      Regular Diet Adult       Significant Results and Procedures   Most Recent 3 CBC's:Recent Labs   Lab Test 11/15/22  0525 11/14/22  2146 11/14/22  0840 11/14/22  0026 10/13/22  0924   WBC  --   --   --  8.1 5.4   HGB 8.7* 9.2* 8.3* 8.8* 7.2*   MCV  --   --   --  85 96   PLT  --   --   --  386 353     Most Recent 3 BMP's:Recent Labs   Lab Test 11/15/22  0525 11/14/22  2030 11/14/22  1043 11/14/22  0026 10/13/22  0924     --   --  140 138   POTASSIUM 3.7 3.5 3.4 2.6* 3.7   CHLORIDE 109  --   --  108 107   CO2 28  --   --  23 27   BUN 3*  --   --  5* 9   CR 0.48*  --   --  0.50* 0.56   ANIONGAP 4  --   --  9 4   SONIDO 8.7  --   --  8.6 8.4*   *  --   --  119* 121*     Most Recent 2 LFT's:Recent Labs   Lab Test 11/14/22 0026   AST 20   ALT 47   ALKPHOS 73   BILITOTAL 0.7   ,   Results for orders placed or performed during the hospital encounter of 11/13/22   CT Chest (PE) Abdomen Pelvis w Contrast    Narrative    EXAM:  CT CHEST WITH CONTRAST - PULMONARY EMBOLISM PROTOCOL  CT ABDOMEN AND PELVIS WITH CONTRAST  LOCATION: [facility]  DATE/TIME: 11/14/2022 2:33 AM    INDICATION: Recent myomectomy. Vaginal bleeding and pelvic pain. Shortness of breath. Lightheadedness. Tachycardia.  COMPARISON: 10/27/2022.    TECHNIQUE: CT angiogram chest and routine CT abdomen pelvis with IV contrast. 2D and 3D MIP reconstructions of the chest were performed by the CT technologist.  Pulmonary arterial phase through the chest and venous phase through the abdomen and pelvis.   Dose reduction techniques were used.  CONTRAST: 91 mL Isovue 370.    FINDINGS:    ANGIOGRAM CHEST: No visualized pulmonary embolus. The aorta is normal in caliber without dissection.    LUNGS AND PLEURA: Unremarkable.    MEDIASTINUM/AXILLAE: Unremarkable.    CORONARY ARTERY CALCIFICATION: Absent.    HEPATOBILIARY: The gallbladder is mildly distended, but otherwise unremarkable.    SPLEEN: Borderline  splenomegaly.    PANCREAS: Unremarkable.    ADRENAL GLANDS: Unremarkable.    KIDNEYS/BLADDER: Unremarkable.    BOWEL: Normal appendix.    LYMPH NODES: Unremarkable.    PELVIC ORGANS: A large heterogeneous intermediate and low attenuation mass-like structure is present in the left central uterus, measuring 9.0 x 7.8 cm in axial dimensions and 8.0 cm in craniocaudal dimension (series 6 image 174). On the comparison   study, there is a similar-appearing mass measuring 8.5 x 7.4 cm in axial dimensions and 7.3 cm in craniocaudal dimension.    MUSCULOSKELETAL: No acute findings.    OTHER: No intraperitoneal free fluid.      Impression    IMPRESSION:   1.  Large 9 cm heterogeneous enhancing mass in the left central aspect of the uterus. This is nonspecific, but could represent a fibroid. Neoplasm cannot be excluded. The mass has not convincingly changed in size since the recent comparison study dated   10/27/2022.  2.  No acute abnormality identified in the chest, abdomen or pelvis.  3.  No visualized pulmonary embolus.       Discharge Medications   Current Discharge Medication List      START taking these medications    Details   desloratadine (CLARINEX) 5 MG tablet Take 1 tablet (5 mg) by mouth daily  Qty: 7 tablet, Refills: 0    Associated Diagnoses: Rash      promethazine (PHENERGAN) 25 MG suppository Place 1 suppository (25 mg) rectally every 6 hours as needed for nausea  Qty: 10 suppository, Refills: 0    Associated Diagnoses: Nausea         CONTINUE these medications which have CHANGED    Details   acetaminophen (TYLENOL) 500 MG tablet Take 2 tablets (1,000 mg) by mouth every 6 hours as needed for pain    Associated Diagnoses: Pelvic pain in female         CONTINUE these medications which have NOT CHANGED    Details   clonazePAM (KLONOPIN) 1 MG tablet Take 1 mg by mouth 3 times daily as needed for anxiety      desvenlafaxine (PRISTIQ) 50 MG 24 hr tablet Take 50 mg by mouth every evening      ferrous sulfate  (FEROSUL) 325 (65 Fe) MG tablet Take 325 mg by mouth 2 times daily      folic acid (FOLVITE) 1 MG tablet Take 1 mg by mouth every evening      methylphenidate (RITALIN) 20 MG tablet Take 30 mg by mouth 3 times daily as needed (1.5 x 20 mg = 30 mg)      mometasone (NASONEX) 50 MCG/ACT nasal spray Spray 2 sprays into both nostrils every evening      Multiple Vitamin (MULTIVITAMIN PO) Take 1 capsule by mouth every evening      ondansetron (ZOFRAN ODT) 4 MG ODT tab Take 4 mg by mouth every 8 hours as needed for nausea      sertraline (ZOLOFT) 100 MG tablet Take 200 mg by mouth every evening (2 x 100 mg = 200 mg)      sulfaSALAzine (AZULFIDINE) 500 MG tablet Take 500 mg by mouth every evening      traZODone (DESYREL) 100 MG tablet Take 50 mg by mouth nightly as needed      zolpidem (AMBIEN) 10 MG tablet Take 10 mg by mouth nightly as needed for sleep         STOP taking these medications       cephALEXin (KEFLEX) 500 MG capsule Comments:   Reason for Stopping:         ibuprofen (ADVIL/MOTRIN) 800 MG tablet Comments:   Reason for Stopping:         oxyCODONE-acetaminophen (PERCOCET) 5-325 MG tablet Comments:   Reason for Stopping:             Allergies   No Known Allergies

## 2022-11-15 NOTE — ED NOTES
Patient feeling nauseated since eating dinner brought in by mother, alivia emanuel with fries. Patient reports she had diarrhea after dinner and not feeling well since. Zofran IV 4 mg and Morphine 4 mg given for abdomen pain and nausea. Will continue to monitor.

## 2022-11-15 NOTE — PLAN OF CARE
Goal Outcome Evaluation:    -diagnostic tests and consults completed and resulted- met   -vital signs normal or at patient baseline - met  -safe disposition plan has been identified - discharging home    Discharge medications given to patient. Discharge education provided to patient; patient verbalized understanding. PIV removed from R forearm. Tele also removed. Belongings with patient. Patient discharging at this time.

## 2022-11-15 NOTE — PROGRESS NOTES
-diagnostic tests and consults completed and resulted- met   -vital signs normal or at patient baseline - met  -safe disposition plan has been identified - discharging home

## 2022-11-15 NOTE — ED NOTES
Patient voiced concerns of widespread rash across chest, arms and face. Wounds on face and arms are scabbed. Rash across is scabbed with some large open areas. Patient states I has been ongoing for several days. States discomfort. Dr. Paez contacted.

## 2022-11-15 NOTE — PROGRESS NOTES
Cornelio for evaluation of rash. She says that she has had a progressive, pruritic rash over her arms, face and breasts that started about 2-3 days ago; she says she had been given an antibiotic (this appears to have been Cephalexin) for an incision infection just before the rash started, and after the rash started, she stopped the antibiotic. She has been scratching these areas a lot. On chaperoned exam, the rash spares the mucosae. She has excoriated maculopapular eruptions over the upper extremities and face; the breasts have similar, fresher appearing eruptions, several of which appear to be sores, oozing serosanguineous fluid, though none appear to have surrounding erythema.    She could have a drug rash. CBC last night was negative for eosinophilia. We will repeat that this AM. For now will try steroid cream to the arms, and Bactroban to the areas on the breasts (will try to avoid steroids there due to the open sores), and Benadryl for the pruritus.

## 2022-11-16 ENCOUNTER — PATIENT OUTREACH (OUTPATIENT)
Dept: CARE COORDINATION | Facility: CLINIC | Age: 37
End: 2022-11-16

## 2022-11-16 NOTE — PROGRESS NOTES
Clinic Care Coordination Contact  Care Coordination Clinician Chart Review    Situation: Patient chart reviewed by care coordinator.        Assessment: Upon chart review, patient is not a candidate for Primary Care Clinic Care Coordination enrollment due to reason stated below:  Pt has connected to Primary Care Provider Dwight RUFF and was seen for follow up care in clinic today, 11/16/22. No additional care coordination needs identified     Plan/Recommendations: Clinic Care Coordination Referral/order cancelled. RN/SW CC will perform no further monitoring/outreaches at this time and will remain available as needed. If new needs arise, a new Care Coordination Referral may be placed.    Libby Maya  Social Work Care Coordinator  [phone]

## 2022-11-29 ENCOUNTER — LAB REQUISITION (OUTPATIENT)
Dept: LAB | Facility: CLINIC | Age: 37
End: 2022-11-29

## 2022-11-29 DIAGNOSIS — N93.9 ABNORMAL UTERINE AND VAGINAL BLEEDING, UNSPECIFIED: ICD-10-CM

## 2022-11-29 PROCEDURE — 82310 ASSAY OF CALCIUM: CPT | Performed by: OBSTETRICS & GYNECOLOGY

## 2022-11-29 PROCEDURE — 83735 ASSAY OF MAGNESIUM: CPT | Performed by: OBSTETRICS & GYNECOLOGY

## 2022-11-29 PROCEDURE — 85027 COMPLETE CBC AUTOMATED: CPT | Performed by: OBSTETRICS & GYNECOLOGY

## 2022-11-30 LAB
ANION GAP SERPL CALCULATED.3IONS-SCNC: 12 MMOL/L (ref 7–15)
BUN SERPL-MCNC: 10.6 MG/DL (ref 6–20)
CALCIUM SERPL-MCNC: 9 MG/DL (ref 8.6–10)
CHLORIDE SERPL-SCNC: 103 MMOL/L (ref 98–107)
CREAT SERPL-MCNC: 0.52 MG/DL (ref 0.51–0.95)
DEPRECATED HCO3 PLAS-SCNC: 23 MMOL/L (ref 22–29)
ERYTHROCYTE [DISTWIDTH] IN BLOOD BY AUTOMATED COUNT: 14.9 % (ref 10–15)
GFR SERPL CREATININE-BSD FRML MDRD: >90 ML/MIN/1.73M2
GLUCOSE SERPL-MCNC: 95 MG/DL (ref 70–99)
HCT VFR BLD AUTO: 27.5 % (ref 35–47)
HGB BLD-MCNC: 8.4 G/DL (ref 11.7–15.7)
HOLD SPECIMEN: NORMAL
MAGNESIUM SERPL-MCNC: 2.1 MG/DL (ref 1.7–2.3)
MCH RBC QN AUTO: 27.5 PG (ref 26.5–33)
MCHC RBC AUTO-ENTMCNC: 30.5 G/DL (ref 31.5–36.5)
MCV RBC AUTO: 90 FL (ref 78–100)
PLATELET # BLD AUTO: 484 10E3/UL (ref 150–450)
POTASSIUM SERPL-SCNC: 4.6 MMOL/L (ref 3.4–5.3)
RBC # BLD AUTO: 3.05 10E6/UL (ref 3.8–5.2)
SODIUM SERPL-SCNC: 138 MMOL/L (ref 136–145)
WBC # BLD AUTO: 9.6 10E3/UL (ref 4–11)

## 2023-05-26 ENCOUNTER — LAB REQUISITION (OUTPATIENT)
Dept: LAB | Facility: CLINIC | Age: 38
End: 2023-05-26

## 2023-05-26 DIAGNOSIS — Z86.2 PERSONAL HISTORY OF DISEASES OF THE BLOOD AND BLOOD-FORMING ORGANS AND CERTAIN DISORDERS INVOLVING THE IMMUNE MECHANISM: ICD-10-CM

## 2023-05-26 LAB
ERYTHROCYTE [DISTWIDTH] IN BLOOD BY AUTOMATED COUNT: 14.7 % (ref 10–15)
HCT VFR BLD AUTO: 32.8 % (ref 35–47)
HGB BLD-MCNC: 9.7 G/DL (ref 11.7–15.7)
MCH RBC QN AUTO: 26.8 PG (ref 26.5–33)
MCHC RBC AUTO-ENTMCNC: 29.6 G/DL (ref 31.5–36.5)
MCV RBC AUTO: 91 FL (ref 78–100)
PLATELET # BLD AUTO: 484 10E3/UL (ref 150–450)
RBC # BLD AUTO: 3.62 10E6/UL (ref 3.8–5.2)
WBC # BLD AUTO: 8.2 10E3/UL (ref 4–11)

## 2023-05-26 PROCEDURE — 85027 COMPLETE CBC AUTOMATED: CPT | Performed by: OBSTETRICS & GYNECOLOGY

## 2023-12-14 ENCOUNTER — TRANSCRIBE ORDERS (OUTPATIENT)
Dept: SLEEP MEDICINE | Facility: CLINIC | Age: 38
End: 2023-12-14
Payer: COMMERCIAL

## 2023-12-14 DIAGNOSIS — R41.0 CONFUSION: Primary | ICD-10-CM

## (undated) DEVICE — KIT PATIENT POSITIONING PIGAZZI LATEX FREE 40580

## (undated) DEVICE — SU MONOCRYL 4-0 PS-2 18" UND Y496G

## (undated) DEVICE — DAVINCI XI CLIP APPLIER LG HEM-O-CLIP 8MM 470230

## (undated) DEVICE — POUCH TISSUE RETRIEVAL 15MM 6.00" INTRO TRS190SB2

## (undated) DEVICE — ENDO TROCAR OPTICAL ACCESS KII Z-THRD 15X100MM C0R37

## (undated) DEVICE — PACK DAVINCI GYN SMA15GDFS1

## (undated) DEVICE — SOL NACL 0.9% IRRIG 1000ML BOTTLE 2F7124

## (undated) DEVICE — SUCTION CANISTER MEDIVAC LINER 3000ML W/LID 65651-530

## (undated) DEVICE — SOL ADH LIQUID BENZOIN SWAB 0.6ML C1544

## (undated) DEVICE — DAVINCI XI OBTURATOR BLADELESS 8MM 470359

## (undated) DEVICE — SOL WATER IRRIG 1000ML BOTTLE 2F7114

## (undated) DEVICE — DAVINCI XI MONOPOLAR SCISSORS HOT SHEARS 8MM 470179

## (undated) DEVICE — SYR 10ML FINGER CONTROL W/O NDL 309695

## (undated) DEVICE — SOL NACL 0.9% INJ 1000ML BAG 2B1324X

## (undated) DEVICE — DAVINCI XI GRASPER ENDOWRIST PROGRASP 470093

## (undated) DEVICE — CATH TRAY FOLEY SURESTEP 16FR WDRAIN BAG STLK LATEX A300316A

## (undated) DEVICE — DRAPE CV SPLIT II 147X106" 9158

## (undated) DEVICE — LINEN TOWEL PACK X5 5464

## (undated) DEVICE — DAVINCI XI DRAPE COLUMN 470341

## (undated) DEVICE — DAVINCI HOT SHEARS TIP COVER  400180

## (undated) DEVICE — DAVINCI XI NDL DRIVER LARGE 470006

## (undated) DEVICE — SUCTION IRR STRYKERFLOW II W/TIP 250-070-520

## (undated) DEVICE — TUBING CONMED AIRSEAL SMOKE EVAC INSUFFLATION ASM-EVAC

## (undated) DEVICE — SYSTEM LAPAROVUE VISIBILITY LAPVUE10

## (undated) DEVICE — SPONGE RAY-TEC 4X4" 7317

## (undated) DEVICE — DAVINCI XI DRAPE ARM 470015

## (undated) DEVICE — ESU GROUND PAD UNIVERSAL W/O CORD

## (undated) DEVICE — DAVINCI XI FORCEP TENACULUM 8MM 470207

## (undated) DEVICE — NDL INSUFFLATION 13GA 120MM C2201

## (undated) DEVICE — SU VICRYL 0 CT-2 27" J334H

## (undated) DEVICE — DAVINCI XI ESU FCP BIPOLAR MARYLAND 470172

## (undated) DEVICE — DAVINCI XI SEAL UNIVERSAL 5-8MM 470361

## (undated) DEVICE — GLOVE BIOGEL PI ULTRATOUCH G SZ 6.5 42165

## (undated) RX ORDER — HYDROMORPHONE HYDROCHLORIDE 1 MG/ML
INJECTION, SOLUTION INTRAMUSCULAR; INTRAVENOUS; SUBCUTANEOUS
Status: DISPENSED
Start: 2022-10-19

## (undated) RX ORDER — HYDROMORPHONE HCL IN WATER/PF 6 MG/30 ML
PATIENT CONTROLLED ANALGESIA SYRINGE INTRAVENOUS
Status: DISPENSED
Start: 2022-10-19

## (undated) RX ORDER — VASOPRESSIN 20 U/ML
INJECTION PARENTERAL
Status: DISPENSED
Start: 2022-10-19

## (undated) RX ORDER — HYDROXYZINE HYDROCHLORIDE 50 MG/ML
INJECTION, SOLUTION INTRAMUSCULAR
Status: DISPENSED
Start: 2022-10-19

## (undated) RX ORDER — LIDOCAINE HYDROCHLORIDE 20 MG/ML
INJECTION, SOLUTION EPIDURAL; INFILTRATION; INTRACAUDAL; PERINEURAL
Status: DISPENSED
Start: 2022-10-19

## (undated) RX ORDER — FENTANYL CITRATE 0.05 MG/ML
INJECTION, SOLUTION INTRAMUSCULAR; INTRAVENOUS
Status: DISPENSED
Start: 2022-10-19

## (undated) RX ORDER — FENTANYL CITRATE 50 UG/ML
INJECTION, SOLUTION INTRAMUSCULAR; INTRAVENOUS
Status: DISPENSED
Start: 2022-10-19

## (undated) RX ORDER — ONDANSETRON 2 MG/ML
INJECTION INTRAMUSCULAR; INTRAVENOUS
Status: DISPENSED
Start: 2022-10-19

## (undated) RX ORDER — BUPIVACAINE HYDROCHLORIDE AND EPINEPHRINE 5; 5 MG/ML; UG/ML
INJECTION, SOLUTION EPIDURAL; INTRACAUDAL; PERINEURAL
Status: DISPENSED
Start: 2022-10-19

## (undated) RX ORDER — DEXAMETHASONE SODIUM PHOSPHATE 4 MG/ML
INJECTION, SOLUTION INTRA-ARTICULAR; INTRALESIONAL; INTRAMUSCULAR; INTRAVENOUS; SOFT TISSUE
Status: DISPENSED
Start: 2022-10-19

## (undated) RX ORDER — OXYCODONE HYDROCHLORIDE 5 MG/1
TABLET ORAL
Status: DISPENSED
Start: 2022-10-19